# Patient Record
Sex: MALE
[De-identification: names, ages, dates, MRNs, and addresses within clinical notes are randomized per-mention and may not be internally consistent; named-entity substitution may affect disease eponyms.]

---

## 2020-06-21 ENCOUNTER — HOSPITAL ENCOUNTER (INPATIENT)
Dept: HOSPITAL 92 - ERS | Age: 75
LOS: 11 days | Discharge: HOME HEALTH | DRG: 871 | End: 2020-07-02
Attending: INTERNAL MEDICINE | Admitting: INTERNAL MEDICINE
Payer: MEDICARE

## 2020-06-21 VITALS — BODY MASS INDEX: 18.3 KG/M2

## 2020-06-21 DIAGNOSIS — Z79.82: ICD-10-CM

## 2020-06-21 DIAGNOSIS — I21.A1: ICD-10-CM

## 2020-06-21 DIAGNOSIS — J12.89: ICD-10-CM

## 2020-06-21 DIAGNOSIS — Z79.899: ICD-10-CM

## 2020-06-21 DIAGNOSIS — E11.65: ICD-10-CM

## 2020-06-21 DIAGNOSIS — E87.1: ICD-10-CM

## 2020-06-21 DIAGNOSIS — Z79.02: ICD-10-CM

## 2020-06-21 DIAGNOSIS — E11.22: ICD-10-CM

## 2020-06-21 DIAGNOSIS — Z99.2: ICD-10-CM

## 2020-06-21 DIAGNOSIS — N18.6: ICD-10-CM

## 2020-06-21 DIAGNOSIS — U07.1: ICD-10-CM

## 2020-06-21 DIAGNOSIS — G93.41: ICD-10-CM

## 2020-06-21 DIAGNOSIS — I10: ICD-10-CM

## 2020-06-21 DIAGNOSIS — I25.2: ICD-10-CM

## 2020-06-21 DIAGNOSIS — A41.89: Primary | ICD-10-CM

## 2020-06-21 DIAGNOSIS — E87.6: ICD-10-CM

## 2020-06-21 DIAGNOSIS — D63.1: ICD-10-CM

## 2020-06-21 DIAGNOSIS — Z79.84: ICD-10-CM

## 2020-06-21 DIAGNOSIS — I25.10: ICD-10-CM

## 2020-06-21 LAB
ALBUMIN SERPL BCG-MCNC: 3.3 G/DL (ref 3.4–4.8)
ALP SERPL-CCNC: 104 U/L (ref 40–110)
ALT SERPL W P-5'-P-CCNC: (no result) U/L (ref 8–55)
ANION GAP SERPL CALC-SCNC: 14 MMOL/L (ref 10–20)
AST SERPL-CCNC: 13 U/L (ref 5–34)
BASOPHILS # BLD AUTO: 0 THOU/UL (ref 0–0.2)
BASOPHILS NFR BLD AUTO: 0 % (ref 0–1)
BILIRUB SERPL-MCNC: 1.1 MG/DL (ref 0.2–1.2)
BUN SERPL-MCNC: 14 MG/DL (ref 8.4–25.7)
CALCIUM SERPL-MCNC: 8.6 MG/DL (ref 7.8–10.44)
CHLORIDE SERPL-SCNC: 93 MMOL/L (ref 98–107)
CK MB SERPL-MCNC: 0.8 NG/ML (ref 0–6.6)
CK SERPL-CCNC: 37 U/L (ref 30–200)
CO2 SERPL-SCNC: 33 MMOL/L (ref 23–31)
CREAT CL PREDICTED SERPL C-G-VRATE: 0 ML/MIN (ref 70–130)
EOSINOPHIL # BLD AUTO: 0 THOU/UL (ref 0–0.7)
EOSINOPHIL NFR BLD AUTO: 0.3 % (ref 0–10)
GLOBULIN SER CALC-MCNC: 3.6 G/DL (ref 2.4–3.5)
GLUCOSE SERPL-MCNC: 103 MG/DL (ref 83–110)
HGB BLD-MCNC: 9.6 G/DL (ref 14–18)
HGB BLD-MCNC: 9.8 G/DL (ref 14–18)
LIPASE SERPL-CCNC: 15 U/L (ref 8–78)
LYMPHOCYTES # BLD: 0.4 THOU/UL (ref 1.2–3.4)
LYMPHOCYTES NFR BLD AUTO: 16.2 % (ref 21–51)
MCH RBC QN AUTO: 36.7 PG (ref 27–31)
MCV RBC AUTO: 103 FL (ref 78–98)
MDIFF COMPLETE?: YES
MONOCYTES # BLD AUTO: 0.2 THOU/UL (ref 0.11–0.59)
MONOCYTES NFR BLD AUTO: 9 % (ref 0–10)
NEUTROPHILS # BLD AUTO: 2 THOU/UL (ref 1.4–6.5)
NEUTROPHILS NFR BLD AUTO: 74.6 % (ref 42–75)
PLATELET # BLD AUTO: 78 THOU/UL (ref 130–400)
PLATELET # BLD AUTO: 99 THOU/UL (ref 130–400)
POTASSIUM SERPL-SCNC: 3.3 MMOL/L (ref 3.5–5.1)
RBC # BLD AUTO: 2.66 MILL/UL (ref 4.7–6.1)
SODIUM SERPL-SCNC: 137 MMOL/L (ref 136–145)
TROPONIN I SERPL DL<=0.01 NG/ML-MCNC: 0.43 NG/ML (ref ?–0.03)
TROPONIN I SERPL DL<=0.01 NG/ML-MCNC: 0.47 NG/ML (ref ?–0.03)
WBC # BLD AUTO: 2.7 THOU/UL (ref 4.8–10.8)

## 2020-06-21 PROCEDURE — 82728 ASSAY OF FERRITIN: CPT

## 2020-06-21 PROCEDURE — 85025 COMPLETE CBC W/AUTO DIFF WBC: CPT

## 2020-06-21 PROCEDURE — 82550 ASSAY OF CK (CPK): CPT

## 2020-06-21 PROCEDURE — 8E0ZXY6 ISOLATION: ICD-10-PCS | Performed by: INTERNAL MEDICINE

## 2020-06-21 PROCEDURE — 87635 SARS-COV-2 COVID-19 AMP PRB: CPT

## 2020-06-21 PROCEDURE — 85379 FIBRIN DEGRADATION QUANT: CPT

## 2020-06-21 PROCEDURE — 85730 THROMBOPLASTIN TIME PARTIAL: CPT

## 2020-06-21 PROCEDURE — 85014 HEMATOCRIT: CPT

## 2020-06-21 PROCEDURE — 84484 ASSAY OF TROPONIN QUANT: CPT

## 2020-06-21 PROCEDURE — 87340 HEPATITIS B SURFACE AG IA: CPT

## 2020-06-21 PROCEDURE — 80048 BASIC METABOLIC PNL TOTAL CA: CPT

## 2020-06-21 PROCEDURE — 93005 ELECTROCARDIOGRAM TRACING: CPT

## 2020-06-21 PROCEDURE — 85049 AUTOMATED PLATELET COUNT: CPT

## 2020-06-21 PROCEDURE — 82553 CREATINE MB FRACTION: CPT

## 2020-06-21 PROCEDURE — 82140 ASSAY OF AMMONIA: CPT

## 2020-06-21 PROCEDURE — 36416 COLLJ CAPILLARY BLOOD SPEC: CPT

## 2020-06-21 PROCEDURE — 94760 N-INVAS EAR/PLS OXIMETRY 1: CPT

## 2020-06-21 PROCEDURE — 96365 THER/PROPH/DIAG IV INF INIT: CPT

## 2020-06-21 PROCEDURE — 86140 C-REACTIVE PROTEIN: CPT

## 2020-06-21 PROCEDURE — 71045 X-RAY EXAM CHEST 1 VIEW: CPT

## 2020-06-21 PROCEDURE — 90935 HEMODIALYSIS ONE EVALUATION: CPT

## 2020-06-21 PROCEDURE — 80061 LIPID PANEL: CPT

## 2020-06-21 PROCEDURE — G0257 UNSCHED DIALYSIS ESRD PT HOS: HCPCS

## 2020-06-21 PROCEDURE — 80053 COMPREHEN METABOLIC PANEL: CPT

## 2020-06-21 PROCEDURE — 85018 HEMOGLOBIN: CPT

## 2020-06-21 PROCEDURE — 36415 COLL VENOUS BLD VENIPUNCTURE: CPT

## 2020-06-21 PROCEDURE — 83690 ASSAY OF LIPASE: CPT

## 2020-06-21 PROCEDURE — U0003 INFECTIOUS AGENT DETECTION BY NUCLEIC ACID (DNA OR RNA); SEVERE ACUTE RESPIRATORY SYNDROME CORONAVIRUS 2 (SARS-COV-2) (CORONAVIRUS DISEASE [COVID-19]), AMPLIFIED PROBE TECHNIQUE, MAKING USE OF HIGH THROUGHPUT TECHNOLOGIES AS DESCRIBED BY CMS-2020-01-R: HCPCS

## 2020-06-21 NOTE — HP
PRIMARY CARE PHYSICIAN:  Unknown.



CHIEF COMPLAINT:  Altered mental status and weakness.



HISTORY OF PRESENT ILLNESS:  The history of present illness is taken from discussion

with the ER physician and the emergency room records as there is no family at the

bedside and the patient is too confused to give me any history and history was also

attempted to be obtained through a professional , but this was not

possible due to the patient's confusion as well.  Mr. Reyes is a 75-year-old

gentleman, who has a history of end-stage renal disease, on hemodialysis.  He was

brought in by family members as he has been tested for coronavirus, but apparently

they did not have the results back yet.  They were noticed that he seemed confused

and he was noted to be febrile on admission.  Apparently, they noticed that he was

shaking.  Otherwise, no other history is obtained.  The patient when asked if he was

having any shortness of breath, he denied this, or any chest pain, he also denied,

but he was not able to tell me where he is.  He was not able to tell the date; when

asked, he just stared at me, but he was able to answer those other questions. 



REVIEW OF SYSTEMS:  Currently unobtainable.



PAST MEDICAL HISTORY:  Significant for what appears to be coronary artery disease

and end-stage renal disease. 



PAST SURGICAL HISTORY:  Unknown.



SOCIAL HISTORY:  Unknown.



ALLERGIES:  NO DRUG ALLERGIES THAT WE ARE AWARE OF.



FAMILY HISTORY:  Unobtainable due to the patient being confused.



MEDICATIONS:  As reported from the EMS, that picked him up, that he was on,

1. Plavix daily.

2. Metoprolol.

3. Glipizide.

4. Simvastatin.



PHYSICAL EXAMINATION:

GENERAL:  He is awake, but he does tend to fall asleep easily during the exam.

Unable to assess orientation.  He is well developed and well nourished in

appearance. 

VITAL SIGNS:  Blood pressure 136/51, heart rate 72, respiratory rate 16, temperature

100.6, and O2 sat 96% on room air. 

HEENT:  Pupils are equal, round, and reactive to light.  Extraocular muscles are

intact.  Sclerae anicteric. 

NECK:  No adenopathy.  No bruits. 

LUNGS:  He has bilateral rales, very soft, faint at the bases.  There is no wheezing

or rhonchi. 

CARDIOVASCULAR:  He has a normal S1 and S2.  There is no S3 or S4.  No murmurs,

clicks or rubs. 

ABDOMEN:  Soft, nontender, and nondistended.  Positive for bowel sounds.  No

rebound.  No guarding.  No organomegaly. 

EXTREMITIES:  There is no clubbing or cyanosis.  No edema.  No calf tenderness.  No

joint effusions. 

NEUROLOGIC:  He is moving all extremities and is grossly nonfocal. 

SKIN AND INTEGUMENT:  There is no significant skin changes, no rash.



LABORATORY DATA:  White blood cell count 2.7, hemoglobin 9.8, hematocrit 27.4, and

platelet count 99.  Chemistry; sodium 137, potassium 3.3, chloride 93, CO2 of 33,

BUN 14, creatinine 3.91, and glucose 103.  Troponin is 0.372. 



DIAGNOSTIC DATA:  He had a chest x-ray, in which he had borderline cardiomegaly,

increased pulmonary vascular markings bilaterally, and was not able to see the

costophrenic angles clearly, that is by my reading.  There is an EKG done by the EMS

showing some T-wave inversions in I and aVL and some nonspecific ST-T wave changes

in lead II and III.  This is by my reading. 



ASSESSMENT:  

1. This is a 75-year-old gentleman, who presents with what appears to be a fairly

sudden onset of altered mental status and fever according to the family.  He has

exposure to the COVID-19 virus by family members and is at high risk of having the

infection himself.  It appears as if he has bilateral infiltrates, fever and an

elevated troponin, which is likely demand due to pneumonia, and sepsis.  He will be

admitted, started on empiric antibiotics for community-acquired pneumonia.  Placed

on respiratory isolation as well as droplet precautions given the risk for COVID

pneumonia.  We will continue to trend his cardiac enzymes as well as his

inflammatory markers.  If his troponins go much higher than the current level, then

we will consider Cardiology evaluation.  The COVID screen has been obtained. 

2. End-stage renal disease.  We will consult Nephrology for maintenance dialysis.

3. We will need to reconcile and restart his home medications, and then also

hopefully we will be able to discuss with family to see if there is any additional

history that can be obtained that might affect the course of his treatment. 







Job ID:  [id number]

## 2020-06-21 NOTE — CON
REASON FOR CONSULTATION:  ESRD on hemodialysis



HISTORY OF PRESENT ILLNESS: Patient is a 75-year-old male with past medical 
history of ESRD, on hemodialysis; hypertension, who was brought to the hospital 
with complaints of fever and confusion. Patient is confused, and no family is 
present at the beds. Attempted to communicate with the patient with the help of 
we do . Patient is unable to engage in conversation does not answer 
questions appropriately. Information was obtained from the chart. Patient was 
brought to ED by the family members as the patient was found to be confused. 
The patient was apparently tested for coronavirus, results pending.  There was 
no mention of shortness of breath, fever, or chills. Patient's hemodialysis 
schedule as outpatient is Tuesday, Thursday and Saturday.  Cannot confirm if 
the patient had dialysis yesterday.



PAST MEDICAL HISTORY:  ESRD, on hemodialysis; hypertension.



PSH, Social history and family history: unable to obtain



PHYSICAL EXAMINATION:

VITAL SIGNS:  Blood pressure 136/51, pulse rate 72 per minute, respiratory rate 
16

per minute, temperature 100.6 Fahrenheit, oxygen saturation 96% on room air. 

GENERAL:  The patient is not in pain or discomfort. 

HEENT:  Mucous membranes are moist.  No icterus. 

CVS:  Rhythm regular, rate normal.  No murmurs. 

LUNGS:  Air entry equal bilaterally, scattered crackles right>left.  No 
wheezing. 

ABDOMEN:  Soft.  No tenderness.  Bowel sounds present. 

EXTREMITIES:  No edema or ulcers. 

CNS: Patient is awake, following commands. 

PSYCHIATRIC:  Not agitated.



LABORATORY DATA:  Hemoglobin 9.6, hematocrit 28.4.  Sodium 137, potassium 3.3,

bicarb 33, BUN 14, creatinine 3.91. 



ASSESSMENT AND PLAN:  

1. End-stage renal disease, on hemodialysis - outpatient schedule is Tuesday, 
Thursday, and Saturday.  We will evaluate in the a.m. for need for renal 
replacement therapy. 

2. Pneumonia - the patient is on Rocephin and Zithromax. 

3. Dose medications for end-stage renal disease. 



Discussed with RN



Thank you for allowing to participate in the management of this patient.







Job ID:  [id number]



MTDD

## 2020-06-21 NOTE — RAD
PORTABLE CHEST ONE VIEW:

 

06/21/2020

 

10:33 a.m.

 

HISTORY:

Fever. Dyspnea.

 

COMPARISON: 

12/24/2009

 

FINDINGS:

The heart size is borderline. There are changes of median sternotomy. There are right-sided vascular 
stents. There are bibasilar infiltrates. No pneumothoraces or large effusions are seen.

 

POS: [facility]

## 2020-06-22 LAB
ANION GAP SERPL CALC-SCNC: 12 MMOL/L (ref 10–20)
APTT PPP: (no result) SEC (ref 22.9–36.1)
APTT PPP: 116.1 SEC (ref 22.9–36.1)
BASOPHILS # BLD AUTO: 0 THOU/UL (ref 0–0.2)
BASOPHILS NFR BLD AUTO: 1.3 % (ref 0–1)
BUN SERPL-MCNC: 21 MG/DL (ref 8.4–25.7)
CALCIUM SERPL-MCNC: 8.2 MG/DL (ref 7.8–10.44)
CHD RISK SERPL-RTO: 2.7 (ref ?–4.5)
CHLORIDE SERPL-SCNC: 95 MMOL/L (ref 98–107)
CHOLEST SERPL-MCNC: 86 MG/DL
CO2 SERPL-SCNC: 31 MMOL/L (ref 23–31)
CREAT CL PREDICTED SERPL C-G-VRATE: 11 ML/MIN (ref 70–130)
CRP SERPL-MCNC: 11.94 MG/DL
EOSINOPHIL # BLD AUTO: 0 THOU/UL (ref 0–0.7)
EOSINOPHIL NFR BLD AUTO: 1.3 % (ref 0–10)
GLUCOSE SERPL-MCNC: 89 MG/DL (ref 83–110)
HDLC SERPL-MCNC: 32 MG/DL
HGB BLD-MCNC: 9 G/DL (ref 14–18)
LDLC SERPL CALC-MCNC: 40 MG/DL
LYMPHOCYTES # BLD: 0.4 THOU/UL (ref 1.2–3.4)
LYMPHOCYTES NFR BLD AUTO: 23.7 % (ref 21–51)
MACROCYTES BLD QL SMEAR: (no result) (100X)
MCH RBC QN AUTO: 35.8 PG (ref 27–31)
MCV RBC AUTO: 105 FL (ref 78–98)
MDIFF COMPLETE?: YES
MONOCYTES # BLD AUTO: 0.2 THOU/UL (ref 0.11–0.59)
MONOCYTES NFR BLD AUTO: 8.3 % (ref 0–10)
NEUTROPHILS # BLD AUTO: 1.2 THOU/UL (ref 1.4–6.5)
NEUTROPHILS NFR BLD AUTO: 65.4 % (ref 42–75)
PLATELET # BLD AUTO: 99 THOU/UL (ref 130–400)
POTASSIUM SERPL-SCNC: 3.1 MMOL/L (ref 3.5–5.1)
RBC # BLD AUTO: 2.51 MILL/UL (ref 4.7–6.1)
SODIUM SERPL-SCNC: 135 MMOL/L (ref 136–145)
TRIGL SERPL-MCNC: 70 MG/DL (ref ?–150)
WBC # BLD AUTO: 1.8 THOU/UL (ref 4.8–10.8)

## 2020-06-22 RX ADMIN — ASPIRIN SCH MG: 81 TABLET ORAL at 08:10

## 2020-06-22 RX ADMIN — Medication SCH ML: at 20:14

## 2020-06-22 RX ADMIN — NITROGLYCERIN SCH: 20 OINTMENT TOPICAL at 15:09

## 2020-06-22 RX ADMIN — Medication SCH ML: at 08:11

## 2020-06-22 RX ADMIN — NITROGLYCERIN SCH: 20 OINTMENT TOPICAL at 06:14

## 2020-06-22 RX ADMIN — NITROGLYCERIN SCH INCH: 20 OINTMENT TOPICAL at 21:11

## 2020-06-22 RX ADMIN — NITROGLYCERIN SCH: 20 OINTMENT TOPICAL at 01:41

## 2020-06-22 NOTE — PRG
DATE OF SERVICE:  06/22/2020



SUBJECTIVE:  Patient was seen and examined at bedside and overnight events noted.

Patient denies any shortness of breath or chest pain or palpitation.  No history of

nausea or vomiting or diarrhea or fever or chills or cramps. 



OBJECTIVE:  GENERAL:  This is a well-built male, in no apparent distress. 

VITAL SIGNS:  Temperature 98.7.  Heart Rate 83.  Respiratory rate 18.  Blood

pressure 143/60. 



HEENT:  Atraumatic, normocephalic.  Oral mucosa is moist. 

NECK:  Supple. 

CARDIOVASCULAR:  S1, S2 heard.  Rate and rhythm regular. 

RESPIRATORY:  Clear to auscultation. 

GASTROINTESTINAL:  Abdomen is soft. 

MUSCULOSKELETAL:  No tenderness.  No edema. 

DERMATOLOGIC:  No skin rash. 

NEUROLOGIC:  Alert and awake and oriented x3.  No focal neurologic deficits. Moving

all the extremities. 

PSYCHIATRIC:  Mood and affect normal.



LABORATORY DATA:  Potassium is 3.1, BUN is 21, creatinine is 4.9.



ASSESSMENT AND PLAN:  

1. End-stage renal disease, on hemodialysis.  Continue dialysis on TTS.

2. History of hypertension.

3. Chronic anemia.

4. Edema, controlled.

5. Hypokalemia.

6. Hyponatremia.

7. Altered mentation.



PLAN:  Plan is to continue on dialysis as tolerated on Tuesday, Thursday, and

Saturday. 







Job ID:  [id number]

## 2020-06-22 NOTE — PDOC.HOSPP
- Subjective


Encounter Date: 06/22/20


Encounter Time: 15:11


Subjective: 





Mr. Reyes was seen today in follow-up of COVID infection and confusion. He is 

more alert than yesterday, but still does not answer questions consistently. He 

denies feeling short of breath and denies chest pain.





- Objective


Vital Signs & Weight: 


 Vital Signs (12 hours)











  Temp Pulse Resp BP Pulse Ox


 


 06/22/20 12:19  98.7 F  83  18  143/64 H  93 L


 


 06/22/20 09:45      97


 


 06/22/20 08:20  98.5 F  104 H  18  146/73 H  96


 


 06/22/20 03:11  99.7 F H  82  18   98








 Weight











Admit Weight                   135 lb 14.287 oz


 


Weight                         135 lb 14.287 oz














Result Diagrams: 


 06/22/20 04:44





 06/22/20 04:44


Additional Labs: 


 Accuchecks











  06/22/20 06/21/20





  12:19 14:38


 


POC Glucose  62 L  68 L














Hospitalist ROS





- Medication


Medications: 


Active Medications











Generic Name Dose Route Start Last Admin





  Trade Name Freq  PRN Reason Stop Dose Admin


 


Acetaminophen  650 mg  06/21/20 15:16  06/22/20 08:10





  Tylenol  PO   650 mg





  Q4H PRN   Administration





  Headache/Fever/Mild Pain (1-3)   





     





     





     


 


Aspirin  81 mg  06/22/20 09:00  06/22/20 08:10





  Ecotrin  PO   81 mg





  DAILY MANDEEP   Administration





     





     





     





     


 


Carvedilol  6.25 mg  06/21/20 17:00  06/22/20 08:10





  Coreg  PO   6.25 mg





  BID-WM MANDEEP   Administration





     





     





     





     


 


Clopidogrel Bisulfate  75 mg  06/22/20 09:00  06/22/20 08:10





  Plavix  PO   75 mg





  DAILY MANDEEP   Administration





     





     





     





     


 


Heparin Sodium (Porcine)  0 units  06/21/20 17:30  06/22/20 00:48





  Heparin 1,000 Units/Ml (10 Ml)  SLOW IVP   3,696 unit





  ASDIR MANDEEP   Administration





     





     





  Protocol   





     


 


Metoprolol Tartrate  25 mg  06/22/20 09:00  06/22/20 08:10





  Lopressor  PO   25 mg





  BID MANDEEP   Administration





     





     





     





     


 


Nitroglycerin  0.5 inch  06/21/20 22:00  06/22/20 15:09





  Nitro-Bid 2% Ointment  TOP   Not Given





  Q8HR [facility]   





     





     





     





     


 


Pantoprazole Sodium  40 mg  06/22/20 09:00  06/22/20 08:10





  Protonix  PO   40 mg





  DAILY MANDEEP   Administration





     





     





     





     


 


Sevelamer Carbonate  1,600 mg  06/22/20 08:00  06/22/20 12:04





  Renvela  PO   Not Given





  TID-WM MANDEEP   





     





     





     





     


 


Sodium Chloride  10 ml  06/22/20 09:00  06/22/20 08:11





  Flush - Normal Saline  IVF   10 ml





  Q12HR MANDEEP   Administration





     





     





     





     














- Exam


Eye: PERRL, anicteric sclera


Heart: RRR, no murmur, no gallops, no rubs, normal peripheral pulses


Respiratory: rales (+ rales bilaterally)


Gastrointestinal: soft, non-tender, non-distended, normal bowel sounds, no 

palpable masses


Extremities: no cyanosis, no edema





Hosp A/P


(1) COVID-19 virus infection


Code(s): U07.1 - COVID-19   Status: Acute   





(2) Metabolic encephalopathy


Code(s): G93.41 - METABOLIC ENCEPHALOPATHY   Status: Acute   





(3) End stage renal disease on dialysis


Code(s): N18.6 - END STAGE RENAL DISEASE; Z99.2 - DEPENDENCE ON RENAL DIALYSIS 

  Status: Chronic   





(4) Hypertension


Code(s): I10 - ESSENTIAL (PRIMARY) HYPERTENSION   Status: Chronic   





(5) CAD (coronary artery disease)


Code(s): I25.10 - ATHSCL HEART DISEASE OF NATIVE CORONARY ARTERY W/O ANG PCTRS 

  Status: Chronic   





- Plan





* Metabolic encephalopathy- likely due to COVID infection


* Will continue supportive care


* Will get ID opinion as to whether he is a candidate for Remdesivir


* Continue to trend Inflammatory markers


* ESRD- continue Dialysis as per Nephrology


* Hyperkalemia- manage as per Nephrology


* HTN- blood pressure is stable


* Elevated troponins- this is likely due to NSTEMI type 2- demand ischemia from 

the COVID infection

## 2020-06-23 LAB
ANION GAP SERPL CALC-SCNC: 15 MMOL/L (ref 10–20)
APTT PPP: 241.1 SEC (ref 22.9–36.1)
BASOPHILS # BLD AUTO: 0 THOU/UL (ref 0–0.2)
BASOPHILS NFR BLD AUTO: 1.1 % (ref 0–1)
BUN SERPL-MCNC: 33 MG/DL (ref 8.4–25.7)
CALCIUM SERPL-MCNC: 8.2 MG/DL (ref 7.8–10.44)
CHLORIDE SERPL-SCNC: 96 MMOL/L (ref 98–107)
CO2 SERPL-SCNC: 28 MMOL/L (ref 23–31)
CREAT CL PREDICTED SERPL C-G-VRATE: 8 ML/MIN (ref 70–130)
EOSINOPHIL # BLD AUTO: 0.1 THOU/UL (ref 0–0.7)
EOSINOPHIL NFR BLD AUTO: 7 % (ref 0–10)
GLUCOSE SERPL-MCNC: 45 MG/DL (ref 83–110)
HBSAG INDEX: 0.27 S/CO (ref 0–0.99)
HGB BLD-MCNC: 7.9 G/DL (ref 14–18)
HGB BLD-MCNC: 9 G/DL (ref 14–18)
LYMPHOCYTES # BLD: 0.6 THOU/UL (ref 1.2–3.4)
LYMPHOCYTES NFR BLD AUTO: 31 % (ref 21–51)
MCH RBC QN AUTO: 35.2 PG (ref 27–31)
MCV RBC AUTO: 106 FL (ref 78–98)
MONOCYTES # BLD AUTO: 0.2 THOU/UL (ref 0.11–0.59)
MONOCYTES NFR BLD AUTO: 9 % (ref 0–10)
NEUTROPHILS # BLD AUTO: 1 THOU/UL (ref 1.4–6.5)
NEUTROPHILS NFR BLD AUTO: 51.9 % (ref 42–75)
PLATELET # BLD AUTO: 88 THOU/UL (ref 130–400)
PLATELET # BLD AUTO: 92 THOU/UL (ref 130–400)
POTASSIUM SERPL-SCNC: 3.7 MMOL/L (ref 3.5–5.1)
RBC # BLD AUTO: 2.25 MILL/UL (ref 4.7–6.1)
SODIUM SERPL-SCNC: 135 MMOL/L (ref 136–145)
WBC # BLD AUTO: 1.9 THOU/UL (ref 4.8–10.8)

## 2020-06-23 RX ADMIN — Medication SCH ML: at 21:15

## 2020-06-23 RX ADMIN — ASPIRIN SCH MG: 81 TABLET ORAL at 09:30

## 2020-06-23 RX ADMIN — NITROGLYCERIN SCH: 20 OINTMENT TOPICAL at 17:13

## 2020-06-23 RX ADMIN — Medication SCH ML: at 11:16

## 2020-06-23 RX ADMIN — NITROGLYCERIN SCH INCH: 20 OINTMENT TOPICAL at 06:02

## 2020-06-23 NOTE — CON
DATE OF CONSULTATION:  06/22/2020



REASON FOR CONSULTATION:  COVID pneumonia.



HISTORY OF PRESENT ILLNESS:  A 75-year-old, history of type 2 diabetes, end-
stage

renal disease, on hemodialysis through an AV fistula in right upper extremity, 
with

history of cough and dyspnea.  Duration of illness is approximately 4 days 
before

admission.  Family member had acquired COVID and probably transmitted to him.  
He

came in yesterday, and on arrival, /60, pulse 87, respirations 24, 
temperature

100.6, and O2 saturation 96% on room air. He appeared disoriented, uncomfortable
,

diffuse expiratory wheezing, S1 and S2, abdomen was soft. Initial chest x-ray 
with

diffuse bilateral infiltrates at the bases.  Currently, Mr. Reyes keeps his eyes

closed, but he answers questions after some insistence.  I was able to have him

drink some sugary fluid because of his decreased blood sugar of 58, and he drank

pretty much almost the entire glass.  He denies any pain at the moment.  He 
does not

have dyspnea.  He is coughing intermittently, but not much, and he is voiding 
in a

diaper. 



PAST MEDICAL HISTORY:  Type 2 diabetes, end-stage renal disease.



SURGICAL HISTORY:  AV fistula placement, prior hemodialysis catheters, and 
coronary

artery bypass graft surgery. 



SOCIAL HISTORY:  Lives in the area with family.



ALLERGIES:  NONE.



FAMILY HISTORY:  Type 2 diabetes.



CURRENT MEDICATIONS:  

1. Tylenol.

2. Ecotrin.

3. Lipitor.

4. Azithromycin.

5. Ceftriaxone.

6. Plavix.

7. Metoprolol.

8. Nitroglycerin.

9. Pantoprazole.

10. Renvela.



PHYSICAL EXAMINATION:

VITAL SIGNS:  Temperature 101, /60, pulse 83, respirations 18, and O2

saturation 93% to 97%. 

SKIN: Shows the AV fistula which is patent in the right upper extremity.  Areas 
of

bruising.  No lymphadenopathy. 

HEENT:  Ocular movements conjugate.  Sclerae are white.  Pupils are 2 mm and

reactive.  Oral cavity with no native teeth remaining. 

LUNGS:  Symmetric air entry. 

HEART:  S1 and S2.  Regular rate, without murmurs. 

ABDOMEN:  Soft, not distended or tender.  No ascites.  No bladder distention. 

MUSCULOSKELETAL:  No joint inflammatory activity.  No back tenderness.  Moving

extremities equally.  No edema. 

NEUROLOGIC:  He is awake but somnolent.  He knows his name.  He tells me he is 
in

the hospital, but cannot tell me which one. Cannot give me the date. 



LABORATORY STUDIES:  Sodium 135, potassium 3.1, creatinine 4.98.  CRP 11.94.

Ferritin 1500.  D-dimer was 0.63. 



ASSESSMENT:  

1. End-stage renal disease secondary to type 2 diabetes, on hemodialysis, right

upper extremity. 

2. COVID pneumonia.  The patient is not eligible for remdesivir, and we will go

ahead and start him on Decadron.  He is saturating 93% on room air, so I am 
sure he

may start deteriorating.  It is very early in his course of illness, and we

will give him 6 mg of Decadron daily IV. 







Job ID:  [id number]



MTDD

## 2020-06-23 NOTE — PDOC.HOSPP
- Subjective


Encounter Date: 06/23/20


Encounter Time: 12:32


Subjective: 





Mr. Reyes was seen today in follow-up of COVID pneumonia and metabolic 

encephalopathy. He does not have any complaints. He is confused, but will 

answer some questions.





- Objective


Vital Signs & Weight: 


 Vital Signs (12 hours)











  Temp Pulse Resp BP Pulse Ox


 


 06/23/20 09:45  98.0 F  70  20  113/49 L  96


 


 06/23/20 08:15      95


 


 06/23/20 05:30  99.1 F    


 


 06/23/20 01:55   76  20  130/53 L  95








 Weight











Admit Weight                   135 lb 14.287 oz


 


Weight                         143 lb 6.4 oz














I&O: 


 











 06/22/20 06/23/20 06/24/20





 06:59 06:59 06:59


 


Intake Total  1037.6 


 


Output Total  0 


 


Balance  1037.6 











Result Diagrams: 


 06/23/20 08:17





 06/23/20 08:17


Additional Labs: 


 Accuchecks











  06/23/20 06/22/20 06/22/20





  10:18 20:39 18:45


 


POC Glucose  61 L  81  89














  06/22/20





  17:27


 


POC Glucose  58 L*














Hospitalist ROS





- Medication


Medications: 


Active Medications











Generic Name Dose Route Start Last Admin





  Trade Name Freq  PRN Reason Stop Dose Admin


 


Acetaminophen  650 mg  06/21/20 15:16  06/22/20 08:10





  Tylenol  PO   650 mg





  Q4H PRN   Administration





  Headache/Fever/Mild Pain (1-3)   





     





     





     


 


Aspirin  81 mg  06/22/20 09:00  06/23/20 09:30





  Ecotrin  PO   81 mg





  DAILY MANDEEP   Administration





     





     





     





     


 


Atorvastatin Calcium  20 mg  06/22/20 21:00  06/22/20 20:13





  Lipitor  PO   20 mg





  HS MANDEEP   Administration





     





     





     





     


 


Carvedilol  6.25 mg  06/21/20 17:00  06/23/20 11:18





  Coreg  PO   Not Given





  BID-WM MANDEEP   





     





     





     





     


 


Clopidogrel Bisulfate  75 mg  06/22/20 09:00  06/23/20 09:30





  Plavix  PO   75 mg





  DAILY MANDEEP   Administration





     





     





     





     


 


Dexamethasone  6 mg  06/23/20 09:00  06/23/20 09:31





  Decadron  SLOW IVP   6 mg





  DAILY MANDEEP   Administration





     





     





     





     


 


Ceftriaxone Sodium 1 gm/  100 mls @ 200 mls/hr  06/22/20 21:00  06/22/20 20:11





  Sodium Chloride  IVPB   100 mls





  2100 MANDEEP   Administration





     





     





     





     


 


Azithromycin 500 mg/ Sodium  250 mls @ 250 mls/hr  06/22/20 21:00  06/22/20 20:

12





  Chloride  IVPB   250 mls





  2100 MANDEEP   Administration





     





     





     





     


 


Dextrose/Water  1,000 mls @ 30 mls/hr  06/23/20 09:00  06/23/20 09:38





  D5w  IV   1,000 mls





  .Q24H MANDEEP   Administration





     





     





     





     


 


Metoprolol Tartrate  25 mg  06/22/20 09:00  06/23/20 11:18





  Lopressor  PO   Not Given





  BID MANDEEP   





     





     





     





     


 


Nitroglycerin  0.5 inch  06/21/20 22:00  06/23/20 06:02





  Nitro-Bid 2% Ointment  TOP   0.5 inch





  Q8HR MANDEEP   Administration





     





     





     





     


 


Pantoprazole Sodium  40 mg  06/22/20 09:00  06/23/20 11:19





  Protonix  PO   Not Given





  DAILY MANDEEP   





     





     





     





     


 


Sevelamer Carbonate  1,600 mg  06/22/20 08:00  06/23/20 12:22





  Renvela  PO   Not Given





  TID-WM MANDEEP   





     





     





     





     


 


Sodium Chloride  10 ml  06/22/20 09:00  06/23/20 11:16





  Flush - Normal Saline  IVF   10 ml





  Q12HR MANDEEP   Administration





     





     





     





     














- Exam


Eye: PERRL, anicteric sclera


Heart: RRR, no murmur, no gallops, no rubs, normal peripheral pulses


Respiratory: rales


Gastrointestinal: soft, non-tender, non-distended, normal bowel sounds, no 

palpable masses, no hepatomegaly


Extremities: no cyanosis, no edema





Hosp A/P


(1) COVID-19 virus infection


Code(s): U07.1 - COVID-19   Status: Acute   





(2) Metabolic encephalopathy


Code(s): G93.41 - METABOLIC ENCEPHALOPATHY   Status: Acute   





(3) End stage renal disease on dialysis


Code(s): N18.6 - END STAGE RENAL DISEASE; Z99.2 - DEPENDENCE ON RENAL DIALYSIS 

  Status: Chronic   





(4) Hypertension


Code(s): I10 - ESSENTIAL (PRIMARY) HYPERTENSION   Status: Chronic   





(5) CAD (coronary artery disease)


Code(s): I25.10 - ATHSCL HEART DISEASE OF NATIVE CORONARY ARTERY W/O ANG PCTRS 

  Status: Chronic   





- Plan





* Metabolic encephalopathy- likely due to COVID infection


* Will continue supportive care


* He has been evaluated by ID. He is not considered a Remdesivir candidate


* Continue to trend Inflammatory markers


* ESRD- continue Dialysis as per Nephrology


* Hypokalemia- improved


* HTN- blood pressure is stable


* Elevated troponins- this is likely due to NSTEMI type 2- demand ischemia from 

the COVID infection- continue Lovenox, therapeutic dose, renal dosed

## 2020-06-23 NOTE — PRG
DATE OF SERVICE:  06/23/2020



SUBJECTIVE:  The patient was seen and examined at bedside and overnight events

noted.  The patient denies any shortness of breath or chest pain or palpitation.  No

history of nausea or vomiting or diarrhea or fever or chills or cramps. 



OBJECTIVE:  GENERAL:  This is a well-built male, in no apparent distress. 

VITAL SIGNS:  Temperature 98.0.  Heart rate 70.  Respiratory rate 20.  Blood

pressure 113/49. 

HEENT:  Atraumatic, normocephalic. Oral mucosa is moist. 

NECK:  Supple. 

CARDIOVASCULAR:  S1, S2 heard.  Rate and rhythm regular. 

RESPIRATORY:  Clear to auscultation. 

GASTROINTESTINAL:  Abdomen is soft. 

MUSCULOSKELETAL:  No tenderness.  No edema. 

DERMATOLOGIC:  No skin rash. 

NEUROLOGIC:  Alert and awake and oriented x3.  No focal neurologic deficits. Moving

all the extremities. 

PSYCHIATRIC:  Mood and affect normal.



LABORATORY DATA:  Potassium is 3.7, BUN is 33, creatinine is 6.7.



ASSESSMENT AND PLAN:  

1. End-stage renal disease.  Continue on hemodialysis.

2. Hypertension.

3. Anemia of chronic disease.

4. Hypokalemia.

5. Hyponatremia.

6. Altered mentation. 



Continue on dialysis as tolerated.





Job ID:  [id number]

## 2020-06-24 RX ADMIN — INSULIN GLARGINE SCH MLS: 100 INJECTION, SOLUTION SUBCUTANEOUS at 20:15

## 2020-06-24 RX ADMIN — Medication SCH: at 08:15

## 2020-06-24 RX ADMIN — ASPIRIN SCH MG: 81 TABLET ORAL at 08:12

## 2020-06-24 RX ADMIN — Medication SCH ML: at 19:51

## 2020-06-24 NOTE — PQF
CLINICAL DOCUMENTATION IMPROVEMENT CLARIFICATION FORM:  ICD-10 Updated

PLEASE DO AN ADDENDUM TO THE PROGRESS NOTE WITH ANY DOCUMENTATION UPDATES OR 
ADDITIONS AND CARRY THROUGH TO DC SUMMARY.   THANK YOU.



DATE:   6/24/2020; 6/25/2020                                ATTN: Dr. Ruiz



Please exercise your independent, professional judgment in responding to the 
clarification form. 

Clinical indicators are provided on the bottom of this form for your review





Please check appropriate box(s) to clarify if the following diagnosis has been 
ruled in or  ruled out:

___________________Sepsis     



[ X ] Ruled in diagnosis

     [  X] Continue to treat        [  ] Resolved

[  ] Ruled out diagnosis

[  ] Improving

[  ] Cannot rule out diagnosis

[  ] Other diagnosis ___________

[  ] Unable to determine



In addition, please specify:

Present on Admission (POA):  [X  ] Yes             [  ] No             [  ] 
Unable to determine



For continuity of documentation, please document condition throughout progress 
notes and discharge summary.  Thank You.



CLINICAL INDICATORS - SIGNS / SYMPTOMS / LABS  / RESULTS AND LOCATION IN MR



ER Record 6/21:  VS: /58,  pulse 80, Resp. 27, Temp 101.9, O2 sat 94 RA



H&P 6/21:  Lab:  WBC 2.7

    Assessment:  It appears as if he has bilateral infiltrates, fever and an 
elevated troponin, 

                         which is  likely demand due to pneumonia, and sepsis. 



RISKS:

H&P 6/21: 74 yo with hx of ESRD on hemodialysis. 

6/22 (Joseph)  COVID-19 virus infection. Metabolic encephalopathy.



TREATMENT:

ID CONSULT 6/22

Order 6/22-6/23: Zithromax 500mg IV; Rocephin 1 gm IV

MAR: Order 6/22: Decadron 6mg slow IV daily

_________________________________________________________



Thank you,

Fern

(This form is maintained as a part of the permanent medical record)

2015 [employer].  All Rights Reserved

Fern Barraza RN, BSN    [email or web address]    Cell Phone: [phone]

                                                              

 

[facility]

## 2020-06-24 NOTE — PRG
DATE OF SERVICE:  06/24/2020



SUBJECTIVE:  Patient was seen and examined at bedside and overnight events noted.

Patient denies any shortness of breath or chest pain or palpitation.  No history of

nausea or vomiting or diarrhea or fever or chills or cramps. 



OBJECTIVE:  General:  This is a well-built male, in no acute distress. 

Vital Signs:  Temperature 98.2.  Heart Rate 72.  Respiratory rate 20.  Blood

pressure 174/76. 

HEENT:  Atraumatic, normocephalic. Oral mucosa is moist. 

Neck:  Supple. 

Cardiovascular:  S1, S2 heard.  Rate and rhythm regular. 

Respiratory:  Clear to auscultation. 

Gastrointestinal:  Abdomen is soft. 

Musculoskeletal:  No tenderness.  No edema. 

Dermatologic:  No skin rash. 

Neurologic:  Alert and awake and oriented x3.  No focal neurologic deficits. Moving

all the extremities. 

Psychiatric:  Mood and affect normal.



LABORATORY DATA:  No labs done today.



ASSESSMENT AND PLAN:  

1. End-stage renal disease.  Continue dialysis Tuesday, Thursday, and Saturday as

tolerated. 

2. Hypertension.

3. Anemia.

4. Hyponatremia.

5. Altered mentation. 



Continue dialysis on Tuesday, Thursday, and Saturday as tolerated.





Job ID:  [id number]

## 2020-06-24 NOTE — PDOC.HOSPP
- Subjective


Encounter Date: 06/24/20


Encounter Time: 16:46


Subjective: 





Mr. Reyes was seen today in follow-up of COVID infection and metabolic 

encephalopathy. He is still confused. 





- Objective


Vital Signs & Weight: 


 Vital Signs (12 hours)











  Temp Pulse Resp BP Pulse Ox


 


 06/24/20 15:29  98.2 F  72  22 H  174/76 H  97


 


 06/24/20 12:09  99.3 F  73  20  151/69 H  100


 


 06/24/20 08:41      100


 


 06/24/20 08:30  98.3 F  73  20  162/71 H 


 


 06/24/20 05:00  98.3 F  72  15  121/63  100








 Weight











Admit Weight                   135 lb 14.287 oz


 


Weight                         142 lb 6.698 oz














I&O: 


 











 06/23/20 06/24/20 06/25/20





 06:59 06:59 06:59


 


Intake Total 1037.6 811 360


 


Output Total 0 0 


 


Balance 1037.6 811 360











Result Diagrams: 


 06/23/20 15:02





 06/23/20 08:17


Additional Labs: 


 Accuchecks











  06/24/20 06/24/20 06/23/20





  11:13 05:01 21:24


 


POC Glucose  334 H  285 H  222 H














  06/23/20 06/23/20





  16:54 06:12


 


POC Glucose  163 H  74














Hospitalist ROS





- Medication


Medications: 


Active Medications











Generic Name Dose Route Start Last Admin





  Trade Name Freq  PRN Reason Stop Dose Admin


 


Acetaminophen  650 mg  06/21/20 15:16  06/22/20 08:10





  Tylenol  PO   650 mg





  Q4H PRN   Administration





  Headache/Fever/Mild Pain (1-3)   





     





     





     


 


Aspirin  81 mg  06/22/20 09:00  06/24/20 08:12





  Ecotrin  PO   81 mg





  DAILY MANDEEP   Administration





     





     





     





     


 


Atorvastatin Calcium  20 mg  06/22/20 21:00  06/23/20 21:15





  Lipitor  PO   20 mg





  HS MANDEEP   Administration





     





     





     





     


 


Carvedilol  6.25 mg  06/21/20 17:00  06/24/20 15:28





  Coreg  PO   6.25 mg





  BID-WM MANDEEP   Administration





     





     





     





     


 


Clopidogrel Bisulfate  75 mg  06/22/20 09:00  06/24/20 08:12





  Plavix  PO   75 mg





  DAILY MANDEEP   Administration





     





     





     





     


 


Dexamethasone  6 mg  06/23/20 09:00  06/24/20 08:12





  Decadron  SLOW IVP   6 mg





  DAILY MANDEEP   Administration





     





     





     





     


 


Enoxaparin Sodium  60 mg  06/24/20 09:00  06/24/20 08:12





  Lovenox  SC   60 mg





  DAILY MANDEEP   Administration





     





     





     





     


 


Metoprolol Tartrate  25 mg  06/22/20 09:00  06/24/20 08:16





  Lopressor  PO   Not Given





  BID MANDEEP   





     





     





     





     


 


Pantoprazole Sodium  40 mg  06/22/20 09:00  06/24/20 08:15





  Protonix  PO   40 mg





  DAILY MANDEEP   Administration





     





     





     





     


 


Sevelamer Carbonate  1,600 mg  06/22/20 08:00  06/24/20 15:28





  Renvela  PO   1,600 mg





  TID-WM MANDEEP   Administration





     





     





     





     


 


Sodium Chloride  10 ml  06/22/20 09:00  06/24/20 08:15





  Flush - Normal Saline  IVF   Not Given





  Q12HR MANDEEP   





     





     





     





     














- Exam


Eye: PERRL, anicteric sclera


Heart: RRR, no murmur, no gallops, no rubs, normal peripheral pulses


Respiratory: CTAB


Gastrointestinal: soft, non-tender, non-distended, normal bowel sounds, no 

palpable masses


Extremities: no cyanosis





Hosp A/P


(1) COVID-19 virus infection


Code(s): U07.1 - COVID-19   Status: Acute   





(2) Metabolic encephalopathy


Code(s): G93.41 - METABOLIC ENCEPHALOPATHY   Status: Acute   





(3) End stage renal disease on dialysis


Code(s): N18.6 - END STAGE RENAL DISEASE; Z99.2 - DEPENDENCE ON RENAL DIALYSIS 

  Status: Chronic   





(4) Hypertension


Code(s): I10 - ESSENTIAL (PRIMARY) HYPERTENSION   Status: Chronic   





(5) CAD (coronary artery disease)


Code(s): I25.10 - ATHSCL HEART DISEASE OF NATIVE CORONARY ARTERY W/O ANG PCTRS 

  Status: Chronic   





- Plan





* Metabolic encephalopathy-


* Will continue supportive care


* He has been evaluated by ID. He is not considered a Remdesivir candidate


* Continue to trend Inflammatory markers


* DM- blood glucose is elevated- will add a low dose Lantus


* CAD- stable- continue anticoagulation


* ESRD- continue dialysis as per Nephrology

## 2020-06-24 NOTE — PRG
DATE OF SERVICE:  06/24/2020



SUBJECTIVE:  Mr. Reyes appears somewhat despondent.  He is missing his family.  
He

wants to go home.  He denies any headaches.  He knows he is at [facility].  
He has

no respiratory symptoms.  He is not coughing.  No abdominal pain. 



OBJECTIVE:  VITAL SIGNS:  His T-max is 101 on 06/22.  He has been afebrile since
,

blood pressure 170/76, pulse 72, respirations 20 to 22, O2 saturation 

HEENT:  His left eye has some hyperemia, but that is from his chronic eye 
problems.

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 

LUNGS:  Symmetric.  Clear breath sounds. 

HEART:  S1, S2.  Regular rate. 

ABDOMEN:  Soft, not distended. 

EXTREMITIES:  I could not get him to move his lower extremities.



LABORATORY DATA:  White cell count starts at 2.7 and now is at 1.9, hemoglobin 9
,

platelets 92 with 51% neutrophils.  Creatinine 6.78.  His ferritin has gone up 
to

2600 from admission.  CRP has went up to 16, now is down to 14.  D-dimer 0.63 
and

has not been repeated. 



DIAGNOSTIC DATA:  Chest x-ray has not been repeated, though one on admission 
with

basilar infiltrates. 



ASSESSMENT AND DISCUSSION:  

1. End-stage renal disease secondary to type-2 diabetes.

2. COVID pneumonia, on Decadron with some improvement in O2 sats.  He has been 
on

room air 100%  continues on Decadron and we will have to continue following

his neurological progress. It appears that isolation and depression is a 
significant factor. 







Job ID:  [id number]



MTDD

## 2020-06-25 LAB
HGB BLD-MCNC: 8.4 G/DL (ref 14–18)
PLATELET # BLD AUTO: 118 THOU/UL (ref 130–400)

## 2020-06-25 RX ADMIN — Medication SCH ML: at 08:54

## 2020-06-25 RX ADMIN — ASPIRIN SCH MG: 81 TABLET ORAL at 08:53

## 2020-06-25 RX ADMIN — INSULIN GLARGINE SCH MLS: 100 INJECTION, SOLUTION SUBCUTANEOUS at 08:52

## 2020-06-25 RX ADMIN — INSULIN GLARGINE SCH: 100 INJECTION, SOLUTION SUBCUTANEOUS at 20:14

## 2020-06-25 RX ADMIN — INSULIN LISPRO PRN UNIT: 100 INJECTION, SOLUTION INTRAVENOUS; SUBCUTANEOUS at 04:57

## 2020-06-25 NOTE — PRG
DATE OF SERVICE:  06/25/2020



SUBJECTIVE:  Patient was seen and examined at bedside and overnight events noted.

Patient denies any shortness of breath or chest pain or palpitation.  No history of

nausea or vomiting or diarrhea or fever or chills or cramps. 



OBJECTIVE:  GENERAL:  This is well-built male, in no apparent distress. 

VITAL SIGNS:  Temperature 98.  Pulse 70.  Respiratory rate __________ .  Blood

pressure 165/73. 

HEENT:  Atraumatic, normocephalic. Oral mucosa is moist. 

NECK:  Supple. 

CARDIOVASCULAR:  S1, S2 heard.  Rate and rhythm regular. 

RESPIRATORY:  Clear to auscultation. 

GASTROINTESTINAL:  Abdomen is soft. 

MUSCULOSKELETAL:  No tenderness.  No edema. 

DERMATOLOGIC:  No skin rash. 

NEUROLOGIC:  Alert and awake and oriented x3.  No focal neurologic deficits. Moving

all the extremities. 

PSYCHIATRIC:  Mood and affect normal.



LABORATORY DATA:  No labs done today.



ASSESSMENT AND PLAN:  

1. End-stage renal disease, continue dialysis as tolerated.

2. Hypertension.

3. Anemia.

4. Hyponatremia.

5. Altered mentation. 

Labs are better.  Mentation is better.  We will continue.







Job ID:  [id number]

## 2020-06-25 NOTE — PDOC.HOSPP
- Subjective


Encounter Date: 06/25/20


Encounter Time: 14:52


Subjective: 





Mr. Reyes was seen today in follow-up of COVID pneumonia and encephalopathy. He 

does not have any new complaints. He is more interactive today. He denies 

dyspnea.





- Objective


Vital Signs & Weight: 


 Vital Signs (12 hours)











  Temp Pulse Resp BP BP BP Pulse Ox


 


 06/25/20 11:28        99


 


 06/25/20 11:26  98.5 F  61  24 H   137/63   100


 


 06/25/20 09:05        99


 


 06/25/20 08:55  98.8 F  70  26 H    165/73 H  99


 


 06/25/20 05:20   71   182/74 H   


 


 06/25/20 04:00  98.2 F  71  20    182/74 H  100








 Weight











Admit Weight                   135 lb 14.287 oz


 


Weight                         142 lb 11.2 oz














I&O: 


 











 06/24/20 06/25/20 06/26/20





 06:59 06:59 06:59


 


Intake Total 811 600 30


 


Output Total 0  


 


Balance 811 600 30











Result Diagrams: 


 06/23/20 15:02





 06/23/20 08:17


Additional Labs: 


 Accuchecks











  06/25/20 06/25/20 06/25/20





  13:45 11:32 04:57


 


POC Glucose  116 H  103  416 H














  06/25/20 06/24/20





  00:15 20:05


 


POC Glucose  542 H  528 H














Hospitalist ROS





- Medication


Medications: 


Active Medications











Generic Name Dose Route Start Last Admin





  Trade Name Freq  PRN Reason Stop Dose Admin


 


Acetaminophen  650 mg  06/21/20 15:16  06/22/20 08:10





  Tylenol  PO   650 mg





  Q4H PRN   Administration





  Headache/Fever/Mild Pain (1-3)   





     





     





     


 


Aspirin  81 mg  06/22/20 09:00  06/25/20 08:53





  Ecotrin  PO   81 mg





  DAILY MANDEEP   Administration





     





     





     





     


 


Atorvastatin Calcium  20 mg  06/22/20 21:00  06/24/20 19:50





  Lipitor  PO   20 mg





  HS MANDEEP   Administration





     





     





     





     


 


Clopidogrel Bisulfate  75 mg  06/22/20 09:00  06/25/20 08:53





  Plavix  PO   75 mg





  DAILY MANDEEP   Administration





     





     





     





     


 


Dexamethasone  6 mg  06/23/20 09:00  06/25/20 08:52





  Decadron  SLOW IVP   6 mg





  DAILY MANDEEP   Administration





     





     





     





     


 


Enoxaparin Sodium  60 mg  06/24/20 09:00  06/25/20 08:53





  Lovenox  SC   Not Given





  DAILY MANDEEP   





     





     





     





     


 


Hydralazine HCl  10 mg  06/21/20 15:16  06/25/20 05:20





  Apresoline  SLOW IVP   10 mg





  Q4H PRN   Administration





  SBP > 180 and HR < 70   





     





     





     


 


Insulin Glargine 10 units/  0.1 mls @ 0 mls/hr  06/24/20 21:00  06/24/20 20:15





  Miscellaneous Medication  SC   0.1 mls





  HS MANDEEP   Administration





     





     





     





     


 


Insulin Glargine 20 units/  0.2 mls @ 0 mls/hr  06/25/20 09:00  06/25/20 08:52





  Miscellaneous Medication  SC   0.2 mls





  QAM MANDEEP   Administration





     





     





     





     


 


Insulin Human Lispro  0 units  06/25/20 00:33  06/25/20 04:57





  Humalog  SC   10 unit





  .MODERATE SLIDING SC PRN   Administration





  Moderate Correctional Scale   





     





     





     


 


Insulin Human Lispro  0 units  06/25/20 00:33  06/25/20 00:47





  Humalog  SC   5 unit





  .BEDTIME SLIDING SC PRN   Administration





  Bedtime Correctional Scale   





     





     





     


 


Metoprolol Tartrate  25 mg  06/22/20 09:00  06/25/20 08:53





  Lopressor  PO   25 mg





  BID MANDEEP   Administration





     





     





     





     


 


Pantoprazole Sodium  40 mg  06/22/20 09:00  06/25/20 08:53





  Protonix  PO   40 mg





  DAILY MANDEEP   Administration





     





     





     





     


 


Sevelamer Carbonate  1,600 mg  06/22/20 08:00  06/25/20 11:43





  Renvela  PO   1,600 mg





  TID-WM MANDEEP   Administration





     





     





     





     


 


Sodium Chloride  10 ml  06/22/20 09:00  06/25/20 08:54





  Flush - Normal Saline  IVF   10 ml





  Q12HR MANDEEP   Administration





     





     





     





     














- Exam


Eye: PERRL


Heart: RRR, no murmur, no gallops, no rubs, normal peripheral pulses


Respiratory: rales


Gastrointestinal: soft, non-tender, non-distended, normal bowel sounds, no 

palpable masses, no hepatomegaly


Extremities: no cyanosis, no edema





Hosp A/P


(1) COVID-19 virus infection


Code(s): U07.1 - COVID-19   Status: Acute   





(2) Metabolic encephalopathy


Code(s): G93.41 - METABOLIC ENCEPHALOPATHY   Status: Acute   





(3) End stage renal disease on dialysis


Code(s): N18.6 - END STAGE RENAL DISEASE; Z99.2 - DEPENDENCE ON RENAL DIALYSIS 

  Status: Chronic   





(4) Hypertension


Code(s): I10 - ESSENTIAL (PRIMARY) HYPERTENSION   Status: Chronic   





(5) CAD (coronary artery disease)


Code(s): I25.10 - ATHSCL HEART DISEASE OF NATIVE CORONARY ARTERY W/O ANG PCTRS 

  Status: Chronic   





- Plan





* Metabolic encephalopathy-


* Will continue supportive care


* Continue Decadron IV


* Continue to trend Inflammatory markers


* DM- blood glucose is elevated-  will increase the dose of Lantus


* CAD- stable- continue anticoagulation


* ESRD- continue dialysis as per Nephrology


* HTN- blood pressure is stable

## 2020-06-26 RX ADMIN — Medication SCH ML: at 08:43

## 2020-06-26 RX ADMIN — INSULIN GLARGINE SCH MLS: 100 INJECTION, SOLUTION SUBCUTANEOUS at 08:42

## 2020-06-26 RX ADMIN — Medication SCH: at 01:57

## 2020-06-26 RX ADMIN — INSULIN LISPRO PRN UNIT: 100 INJECTION, SOLUTION INTRAVENOUS; SUBCUTANEOUS at 18:25

## 2020-06-26 RX ADMIN — ASPIRIN SCH MG: 81 TABLET ORAL at 08:42

## 2020-06-26 RX ADMIN — Medication SCH ML: at 21:35

## 2020-06-26 RX ADMIN — INSULIN GLARGINE SCH MLS: 100 INJECTION, SOLUTION SUBCUTANEOUS at 21:35

## 2020-06-26 NOTE — PRG
DATE OF SERVICE:  06/26/2020



SUBJECTIVE:  Jose Reyes does not appear as  despondent as last time.

He answers questions more easily today.  Denies dyspnea or chest pain or 
headaches.

No abdominal pain. 



OBJECTIVE:  VITAL SIGNS:  He has had a temperature of 100.6 recently, now 99.2,

blood pressure 150/70, and O2 saturations are really good anywhere from 98% to 
100%

and this is on room air. 

LUNGS:  Symmetric, clear breath sounds. 

HEART:  S1, S2, regular rate. 

ABDOMEN:  Soft, not distended or tender. 

EXTREMITIES:  Moves extremities equally.



LABORATORY DATA:  Chemistry is not particularly remarkable.  The ferritin shows 
a

decrease to 2500 and the C-reactive protein is down to 8.9.  The D-dimer has not

been repeated.  He is currently on Decadron, metoprolol, pantoprazole, Renvela. 



ASSESSMENT AND DISCUSSION:  End-stage renal disease secondary to type 2 diabetes
,

COVID pneumonia, on Decadron with clear-cut improvement in O2 saturations.  His

mental state is a little better.  We will go ahead and discontinue Decadron now
, he

has received already 4 days and on room air saturating at %







Job ID:  [id number]



MTDD

## 2020-06-26 NOTE — PDOC.HOSPP
- Subjective


Encounter Date: 06/26/20


Encounter Time: 16:52


Subjective: 





Mr. Reyes was seen today in follow-up of COVID pneumonia, and encephalopathy. 

he denies feeling short of breath. He is a bit less confused today. 





- Objective


Vital Signs & Weight: 


 Vital Signs (12 hours)











  Temp Pulse Pulse Pulse Pulse Resp BP


 


 06/26/20 14:30    69  93  72  


 


 06/26/20 13:48  99.2 F  69     18 


 


 06/26/20 08:52  100.6 F H  87     18 


 


 06/26/20 08:18       


 


 06/26/20 05:42   98     


 


 06/26/20 05:27        192/86 H


 


 06/26/20 05:25  98.9 F  95     18 














  BP BP BP BP BP Pulse Ox Pulse Ox


 


 06/26/20 14:30  141/65 H  122/58 L  157/70 H     93 L


 


 06/26/20 13:48      145/69 H  100 


 


 06/26/20 08:52     159/69 H   98 


 


 06/26/20 08:18       99 


 


 06/26/20 05:42     173/74 H   


 


 06/26/20 05:27       


 


 06/26/20 05:25     182/77 H   99 














  Pulse Ox


 


 06/26/20 14:30  96


 


 06/26/20 13:48 


 


 06/26/20 08:52 


 


 06/26/20 08:18 


 


 06/26/20 05:42 


 


 06/26/20 05:27 


 


 06/26/20 05:25 








 Weight











Admit Weight                   135 lb 14.287 oz


 


Weight                         142 lb 11.2 oz














I&O: 


 











 06/25/20 06/26/20 06/27/20





 06:59 06:59 06:59


 


Intake Total 600 30 


 


Balance 600 30 











Result Diagrams: 


 06/25/20 15:10





 06/23/20 08:17


Additional Labs: 


 Accuchecks











  06/26/20 06/26/20 06/25/20





  13:59 05:14 20:24


 


POC Glucose  133 H  84  90














Hospitalist ROS





- Medication


Medications: 


Active Medications











Generic Name Dose Route Start Last Admin





  Trade Name Freq  PRN Reason Stop Dose Admin


 


Acetaminophen  650 mg  06/21/20 15:16  06/26/20 09:27





  Tylenol  PO   650 mg





  Q4H PRN   Administration





  Headache/Fever/Mild Pain (1-3)   





     





     





     


 


Aspirin  81 mg  06/22/20 09:00  06/26/20 08:42





  Ecotrin  PO   81 mg





  DAILY MANDEEP   Administration





     





     





     





     


 


Atorvastatin Calcium  20 mg  06/22/20 21:00  06/25/20 20:14





  Lipitor  PO   20 mg





  HS MANDEEP   Administration





     





     





     





     


 


Clopidogrel Bisulfate  75 mg  06/22/20 09:00  06/26/20 08:42





  Plavix  PO   75 mg





  DAILY MANDEEP   Administration





     





     





     





     


 


Dexamethasone  6 mg  06/23/20 09:00  06/26/20 08:41





  Decadron  SLOW IVP   6 mg





  DAILY MANDEEP   Administration





     





     





     





     


 


Enoxaparin Sodium  60 mg  06/24/20 09:00  06/26/20 08:41





  Lovenox  SC   60 mg





  DAILY MANDEEP   Administration





     





     





     





     


 


Hydralazine HCl  10 mg  06/21/20 15:16  06/26/20 05:27





  Apresoline  SLOW IVP   10 mg





  Q4H PRN   Administration





  SBP > 180 and HR < 70   





     





     





     


 


Insulin Glargine 10 units/  0.1 mls @ 0 mls/hr  06/24/20 21:00  06/25/20 20:14





  Miscellaneous Medication  SC   Not Given





  [facility]   





     





     





     





     


 


Insulin Glargine 20 units/  0.2 mls @ 0 mls/hr  06/25/20 09:00  06/26/20 08:42





  Miscellaneous Medication  SC   0.2 mls





  QAM MANDEEP   Administration





     





     





     





     


 


Insulin Human Lispro  0 units  06/25/20 00:33  06/25/20 04:57





  Humalog  SC   10 unit





  .MODERATE SLIDING SC PRN   Administration





  Moderate Correctional Scale   





     





     





     


 


Insulin Human Lispro  0 units  06/25/20 00:33  06/25/20 00:47





  Humalog  SC   5 unit





  .BEDTIME SLIDING SC PRN   Administration





  Bedtime Correctional Scale   





     





     





     


 


Metoprolol Tartrate  25 mg  06/22/20 09:00  06/26/20 08:42





  Lopressor  PO   25 mg





  BID MANDEEP   Administration





     





     





     





     


 


Pantoprazole Sodium  40 mg  06/22/20 09:00  06/26/20 08:42





  Protonix  PO   40 mg





  DAILY MANDEEP   Administration





     





     





     





     


 


Sevelamer Carbonate  1,600 mg  06/22/20 08:00  06/26/20 13:48





  Renvela  PO   1,600 mg





  TID-WM MANDEEP   Administration





     





     





     





     


 


Sodium Chloride  10 ml  06/22/20 09:00  06/26/20 08:43





  Flush - Normal Saline  IVF   10 ml





  Q12HR MANDEEP   Administration





     





     





     





     














- Exam


Eye: PERRL, anicteric sclera


Heart: RRR, no murmur, no gallops, no rubs


Respiratory: rales (+ rales in both bases)


Gastrointestinal: soft, non-tender, non-distended, normal bowel sounds, no 

palpable masses, no hepatomegaly


Extremities: no cyanosis, no edema





Hosp A/P


(1) COVID-19 virus infection


Code(s): U07.1 - COVID-19   Status: Acute   





(2) Metabolic encephalopathy


Code(s): G93.41 - METABOLIC ENCEPHALOPATHY   Status: Acute   





(3) End stage renal disease on dialysis


Code(s): N18.6 - END STAGE RENAL DISEASE; Z99.2 - DEPENDENCE ON RENAL DIALYSIS 

  Status: Chronic   





(4) Hypertension


Code(s): I10 - ESSENTIAL (PRIMARY) HYPERTENSION   Status: Chronic   





(5) CAD (coronary artery disease)


Code(s): I25.10 - ATHSCL HEART DISEASE OF NATIVE CORONARY ARTERY W/O ANG PCTRS 

  Status: Chronic   





- Plan





* Metabolic encephalopathy- due to COVID pneumonia


* Will continue supportive care


* Continue Decadron IV


*  Inflammatory markers are trending up


* DM- blood glucose is stable


* CAD- stable- continue anticoagulation


* ESRD- continue dialysis as per Nephrology


* HTN- blood pressure is stable

## 2020-06-26 NOTE — PRG
DATE OF SERVICE:  06/26/2020



SUBJECTIVE:  Patient was seen and examined at bedside and overnight events noted. 



Patient denies any shortness of breath or chest pain or palpitation. 



No history of nausea or vomiting or diarrhea or fever or chills or cramps.



OBJECTIVE:  GENERAL:  This is a well built male, in no apparent distress. 

VITAL SIGNS:  Temperature 100.6.  Heart rate 87.  Respiratory rate 18.  Blood

pressure 159/69. 

HEENT:  Atraumatic, normocephalic.  Oral mucosa is moist 

NECK:  Supple. 

CARDIOVASCULAR:  S1, S2 heard.  Rate and rhythm regular. 

RESPIRATORY:  Clear to auscultation. 

GASTROINTESTINAL:  Abdomen is soft. 

MUSCULOSKELETAL:  No tenderness.  No edema. 

DERMATOLOGIC:  No skin rash. 

NEUROLOGIC:  Alert and awake and oriented X3.  No focal neurologic deficits. Moving

all the extremities. 

PSYCHIATRIC:  Mood and affect normal.



LABORATORY DATA:  Not done today.



ASSESSMENT AND PLAN:  

1. End-stage renal disease, continue on dialysis TTS as tolerated.

2. Hypertension.

3. Anemia.

4. Hyponatremia.

5. Altered mentation. 



Continue on dialysis TTS as tolerated.





Job ID:  [id number]

## 2020-06-27 LAB
ANION GAP SERPL CALC-SCNC: 15 MMOL/L (ref 10–20)
BUN SERPL-MCNC: 61 MG/DL (ref 8.4–25.7)
CALCIUM SERPL-MCNC: 9.3 MG/DL (ref 7.8–10.44)
CHLORIDE SERPL-SCNC: 97 MMOL/L (ref 98–107)
CO2 SERPL-SCNC: 30 MMOL/L (ref 23–31)
CREAT CL PREDICTED SERPL C-G-VRATE: 9 ML/MIN (ref 70–130)
GLUCOSE SERPL-MCNC: 54 MG/DL (ref 83–110)
HGB BLD-MCNC: 9.3 G/DL (ref 14–18)
PLATELET # BLD AUTO: 144 THOU/UL (ref 130–400)
POTASSIUM SERPL-SCNC: 3.9 MMOL/L (ref 3.5–5.1)
SODIUM SERPL-SCNC: 138 MMOL/L (ref 136–145)

## 2020-06-27 RX ADMIN — Medication SCH ML: at 20:21

## 2020-06-27 RX ADMIN — Medication SCH ML: at 09:23

## 2020-06-27 RX ADMIN — INSULIN GLARGINE SCH MLS: 100 INJECTION, SOLUTION SUBCUTANEOUS at 09:23

## 2020-06-27 RX ADMIN — ASPIRIN SCH MG: 81 TABLET ORAL at 09:23

## 2020-06-27 RX ADMIN — INSULIN GLARGINE SCH: 100 INJECTION, SOLUTION SUBCUTANEOUS at 22:15

## 2020-06-27 NOTE — PRG
DATE OF SERVICE:  06/27/2020



SUBJECTIVE:  The patient is on COVID isolation.



OBJECTIVE:  VITAL SIGNS:  Temperature 98.3, pulse 75, respiratory rate 20, blood

pressure 168/60. 



LABORATORY DATA:  Not done today.



ASSESSMENT AND PLAN:  

1. End-stage renal disease.  We will check labs today.

2. Edema, controlled.

3. Hypertension.

4. Chronic anemia. 



Recheck labs today.  Monitor labs.  Continue dialysis as tolerated.







Job ID:  [id number]

## 2020-06-28 PROCEDURE — 5A1D70Z PERFORMANCE OF URINARY FILTRATION, INTERMITTENT, LESS THAN 6 HOURS PER DAY: ICD-10-PCS | Performed by: INTERNAL MEDICINE

## 2020-06-28 RX ADMIN — HEPARIN SODIUM SCH UNITS: 5000 INJECTION, SOLUTION INTRAVENOUS; SUBCUTANEOUS at 20:04

## 2020-06-28 RX ADMIN — INSULIN GLARGINE SCH: 100 INJECTION, SOLUTION SUBCUTANEOUS at 08:48

## 2020-06-28 RX ADMIN — Medication SCH ML: at 20:04

## 2020-06-28 RX ADMIN — INSULIN GLARGINE SCH: 100 INJECTION, SOLUTION SUBCUTANEOUS at 20:52

## 2020-06-28 RX ADMIN — Medication SCH ML: at 08:49

## 2020-06-28 RX ADMIN — ASPIRIN SCH MG: 81 TABLET ORAL at 08:48

## 2020-06-28 NOTE — PRG
DATE OF SERVICE:  



SUBJECTIVE:  The patient is on COVID isolation.



OBJECTIVE:  VITAL SIGNS:  Temperature 98.3, pulse __________, respiratory rate 20,

blood pressure 151/60. 



LABORATORY DATA:  Not done today.



ASSESSMENT AND PLAN:  

1. End-stage renal disease.  Continue dialysis.

2. Edema, controlled.

3. Hypertension.

4. Anemia of chronic disease.

5. Monitor labs.  Continue dialysis as tolerated on Tuesday, Thursday, Saturday.







Job ID:  [id number]

## 2020-06-28 NOTE — PDOC.HOSPP
- Subjective


Encounter Date: 06/28/20


Encounter Time: 16:03


Subjective: 





Mr. Reyes was seen today in follow-up of COVID infection and metabolic 

encephalopathy. He is a bit confused again today. He is asking for his shirt 

and a jacket. He denies feeling short of breath.





- Objective


Vital Signs & Weight: 


 Vital Signs (12 hours)











  Temp Pulse Resp BP BP BP Pulse Ox


 


 06/28/20 12:00  98.3 F  66  20    151/68 H  100


 


 06/28/20 08:00  98.3 F  68  24 H  163/72 H    98


 


 06/28/20 04:36   74    147/64 H  


 


 06/28/20 04:12   75     


 


 06/28/20 04:08  98.2 F  71  20    188/79 H  100








 Weight











Admit Weight                   135 lb 14.287 oz


 


Weight                         132 lb 15.02 oz














I&O: 


 











 06/27/20 06/28/20 06/29/20





 06:59 06:59 06:59


 


Intake Total 520 780 


 


Output Total  1500 


 


Balance 520 -720 











Result Diagrams: 


 06/27/20 15:25





 06/27/20 16:55


Additional Labs: 


 Accuchecks











  06/28/20 06/28/20 06/28/20





  12:08 04:44 04:08


 


POC Glucose  98  85  61 L














  06/27/20 06/27/20 06/27/20





  20:35 18:30 17:24


 


POC Glucose  96  148 H  66 L














Hospitalist ROS





- Medication


Medications: 


Active Medications











Generic Name Dose Route Start Last Admin





  Trade Name Freq  PRN Reason Stop Dose Admin


 


Acetaminophen  650 mg  06/21/20 15:16  06/28/20 04:27





  Tylenol  PO   650 mg





  Q4H PRN   Administration





  Headache/Fever/Mild Pain (1-3)   





     





     





     


 


Aspirin  81 mg  06/22/20 09:00  06/28/20 08:48





  Ecotrin  PO   81 mg





  DAILY MANDEEP   Administration





     





     





     





     


 


Atorvastatin Calcium  20 mg  06/22/20 21:00  06/27/20 20:20





  Lipitor  PO   20 mg





  HS MANDEEP   Administration





     





     





     





     


 


Clopidogrel Bisulfate  75 mg  06/22/20 09:00  06/28/20 08:48





  Plavix  PO   75 mg





  DAILY MANDEEP   Administration





     





     





     





     


 


Dextrose/Water  25 gm  06/25/20 00:33  06/27/20 17:56





  Dextrose 50%  SLOW IVP   25 gm





  PRN PRN   Administration





  Hypoglycemia   





     





     





     


 


Enoxaparin Sodium  60 mg  06/24/20 09:00  06/28/20 08:47





  Lovenox  SC   60 mg





  DAILY MANDEEP   Administration





     





     





     





     


 


Hydralazine HCl  10 mg  06/21/20 15:16  06/28/20 04:12





  Apresoline  SLOW IVP   10 mg





  Q4H PRN   Administration





  SBP > 180 and HR < 70   





     





     





     


 


Hydralazine HCl  25 mg  06/26/20 07:34  06/27/20 23:47





  Apresoline  PO   25 mg





  TID PRN   Administration





  SBP Greater Than 170   





     





     





     


 


Insulin Glargine 5 units/  0.05 mls @ 0 mls/hr  06/27/20 21:00  06/27/20 22:15





  Miscellaneous Medication  SC   Not Given





  HS MANDEEP   





     





     





     





     


 


Insulin Glargine 10 units/  0.1 mls @ 0 mls/hr  06/28/20 09:00  06/28/20 08:48





  Miscellaneous Medication  SC   Not Given





  QAM MANDEEP   





     





     





     





     


 


Insulin Human Lispro  0 units  06/25/20 00:33  06/26/20 18:25





  Humalog  SC   4 unit





  .MODERATE SLIDING SC PRN   Administration





  Moderate Correctional Scale   





     





     





     


 


Insulin Human Lispro  0 units  06/25/20 00:33  06/25/20 00:47





  Humalog  SC   5 unit





  .BEDTIME SLIDING SC PRN   Administration





  Bedtime Correctional Scale   





     





     





     


 


Metoprolol Tartrate  25 mg  06/22/20 09:00  06/28/20 08:47





  Lopressor  PO   25 mg





  BID MANDEEP   Administration





     





     





     





     


 


Pantoprazole Sodium  40 mg  06/22/20 09:00  06/28/20 08:47





  Protonix  PO   40 mg





  DAILY MANDEEP   Administration





     





     





     





     


 


Sevelamer Carbonate  1,600 mg  06/22/20 08:00  06/28/20 11:46





  Renvela  PO   1,600 mg





  TID-WM MANDEEP   Administration





     





     





     





     


 


Sodium Chloride  10 ml  06/22/20 09:00  06/28/20 08:49





  Flush - Normal Saline  IVF   10 ml





  Q12HR MANDEEP   Administration





     





     





     





     














- Exam


Eye: PERRL, anicteric sclera


Heart: RRR, no murmur, no gallops, no rubs, normal peripheral pulses


Respiratory: CTAB, no wheezes, no rales, normal chest expansion


Gastrointestinal: soft, non-tender, non-distended, normal bowel sounds, no 

palpable masses


Extremities: no cyanosis, no edema





Hosp A/P


(1) COVID-19 virus infection


Code(s): U07.1 - COVID-19   Status: Acute   





(2) Metabolic encephalopathy


Code(s): G93.41 - METABOLIC ENCEPHALOPATHY   Status: Acute   





(3) End stage renal disease on dialysis


Code(s): N18.6 - END STAGE RENAL DISEASE; Z99.2 - DEPENDENCE ON RENAL DIALYSIS 

  Status: Chronic   





(4) Hypertension


Code(s): I10 - ESSENTIAL (PRIMARY) HYPERTENSION   Status: Chronic   





(5) CAD (coronary artery disease)


Code(s): I25.10 - ATHSCL HEART DISEASE OF NATIVE CORONARY ARTERY W/O ANG PCTRS 

  Status: Chronic   





- Plan





* Metabolic encephalopathy- due to COVID pneumonia


* Continue Decadron IV


*  Inflammatory markers 


* Continue other supportive care


* DM- blood glucose is stable


* CAD- stable- patient is confused and at times tryng to get out of bed- will 

change to Heparin SC( DVT prophylaxis dose )


* ESRD- continue dialysis as per Nephrology


* HTN- blood pressure is now a bit labile- will monitor and continue PRN 

medications

## 2020-06-28 NOTE — EKG
Test Reason : SOB FEVER

Blood Pressure : ***/*** mmHG

Vent. Rate : 088 BPM     Atrial Rate : 088 BPM

   P-R Int : 000 ms          QRS Dur : 088 ms

    QT Int : 394 ms       P-R-T Axes : 000 -41 111 degrees

   QTc Int : 476 ms

 

Accelerated Junctional rhythm

Left axis deviation

Left ventricular hypertrophy with repolarization abnormality

Inferior infarct , age undetermined

Abnormal ECG

 

Confirmed by JOSE KAUR ([id number]),  TIESHA ANTONY ([id number]) on 6/28/2020 12:18:54 PM

 

Referred By:             Confirmed By:JOSE KAUR

## 2020-06-29 LAB
HGB BLD-MCNC: 9 G/DL (ref 14–18)
PLATELET # BLD AUTO: 120 THOU/UL (ref 130–400)

## 2020-06-29 RX ADMIN — ASPIRIN SCH MG: 81 TABLET ORAL at 09:56

## 2020-06-29 RX ADMIN — INSULIN GLARGINE SCH: 100 INJECTION, SOLUTION SUBCUTANEOUS at 22:02

## 2020-06-29 RX ADMIN — HEPARIN SODIUM SCH UNITS: 5000 INJECTION, SOLUTION INTRAVENOUS; SUBCUTANEOUS at 20:17

## 2020-06-29 RX ADMIN — HEPARIN SODIUM SCH UNITS: 5000 INJECTION, SOLUTION INTRAVENOUS; SUBCUTANEOUS at 09:56

## 2020-06-29 RX ADMIN — Medication SCH ML: at 20:55

## 2020-06-29 RX ADMIN — INSULIN GLARGINE SCH: 100 INJECTION, SOLUTION SUBCUTANEOUS at 08:35

## 2020-06-29 RX ADMIN — Medication SCH ML: at 09:57

## 2020-06-29 NOTE — PRG
DATE OF SERVICE:  06/29/2020



SUBJECTIVE:  A 75-year-old gentleman, being seen for end-stage renal disease.  The

patient denies any nausea, vomiting, or chest pain. 



OBJECTIVE:  GENERAL:  The patient is awake and alert. 

VITAL SIGNS:  Afebrile, pulse 75, breathing at 16, blood pressure 152/67. 

HEENT:  Head normocephalic and atraumatic.  Eyes intact, no ulcers.  Nose intact, no

ulcers.  Ears intact, no ulcers. 

NECK:  Supple.  No JVD. 

CHEST:  Symmetrical and clear. 

CARDIOVASCULAR:  Shows S1 and S2, no rub, no murmur. 

GASTROINTESTINAL:  Abdomen is soft, bowel sounds positive. 

EXTREMITIES:  Show no edema or ulcers. 

SKIN:  Shows no rash or petechiae. 

MUSCULOSKELETAL:  Shows no joint swelling or stiffness. 

GENITOURINARY:  Shows no Cedeno or CVA tenderness. 

NEUROLOGIC:  Motor intact.  Cranial nerves intact.



ASSESSMENT AND PLAN:  

1. End-stage renal disease, plan dialysis.

2. Hypertension, stable.

3. Anemia, stable. 

Medication based on GFR appropriate.







Job ID:  [id number]

## 2020-06-29 NOTE — PDOC.HOSPP
- Subjective


Encounter Date: 06/29/20


Encounter Time: 11:00


Subjective: 





no sob or chest pain


does not interact much


is not eating much per staff





- Objective


Vital Signs & Weight: 


 Vital Signs (12 hours)











  Temp Pulse Resp BP BP Pulse Ox


 


 06/29/20 11:52     152/70 H  


 


 06/29/20 11:05  98.6 F  71  18   152/67 H  100


 


 06/29/20 10:39  98.3 F  78  20   161/69 H  100


 


 06/29/20 04:00  98.9 F  77  20   184/80 H  96








 Weight











Admit Weight                   135 lb 14.287 oz


 


Weight                         132 lb 15.02 oz














I&O: 


 











 06/28/20 06/29/20 06/30/20





 06:59 06:59 06:59


 


Intake Total 780 740 


 


Output Total 1500 0 


 


Balance -720 740 











Result Diagrams: 


 06/27/20 15:25





 06/27/20 16:55


Additional Labs: 


 Accuchecks











  06/29/20 06/28/20 06/28/20





  04:11 20:19 19:00


 


POC Glucose  108  118 H  104














Hospitalist ROS





- Medication


Medications: 


Active Medications











Generic Name Dose Route Start Last Admin





  Trade Name Freq  PRN Reason Stop Dose Admin


 


Acetaminophen  650 mg  06/21/20 15:16  06/28/20 04:27





  Tylenol  PO   650 mg





  Q4H PRN   Administration





  Headache/Fever/Mild Pain (1-3)   





     





     





     


 


Aspirin  81 mg  06/22/20 09:00  06/29/20 09:56





  Ecotrin  PO   81 mg





  DAILY MADNEEP   Administration





     





     





     





     


 


Atorvastatin Calcium  20 mg  06/22/20 21:00  06/28/20 20:03





  Lipitor  PO   20 mg





  HS MANDEEP   Administration





     





     





     





     


 


Clopidogrel Bisulfate  75 mg  06/22/20 09:00  06/29/20 09:55





  Plavix  PO   75 mg





  DAILY MANDEEP   Administration





     





     





     





     


 


Dextrose/Water  25 gm  06/25/20 00:33  06/27/20 17:56





  Dextrose 50%  SLOW IVP   25 gm





  PRN PRN   Administration





  Hypoglycemia   





     





     





     


 


Heparin Sodium (Porcine)  5,000 units  06/28/20 21:00  06/29/20 09:56





  Heparin  SC   5,000 units





  BID MANDEEP   Administration





     





     





     





     


 


Hydralazine HCl  10 mg  06/21/20 15:16  06/29/20 10:16





  Apresoline  SLOW IVP   10 mg





  Q4H PRN   Administration





  SBP > 180 and HR < 70   





     





     





     


 


Hydralazine HCl  25 mg  06/26/20 07:34  06/28/20 23:41





  Apresoline  PO   25 mg





  TID PRN   Administration





  SBP Greater Than 170   





     





     





     


 


Insulin Glargine 5 units/  0.05 mls @ 0 mls/hr  06/27/20 21:00  06/28/20 20:52





  Miscellaneous Medication  SC   Not Given





  HS MANDEEP   





     





     





     





     


 


Insulin Glargine 10 units/  0.1 mls @ 0 mls/hr  06/28/20 09:00  06/29/20 08:35





  Miscellaneous Medication  SC   Not Given





  QAM MANDEEP   





     





     





     





     


 


Insulin Human Lispro  0 units  06/25/20 00:33  06/26/20 18:25





  Humalog  SC   4 unit





  .MODERATE SLIDING SC PRN   Administration





  Moderate Correctional Scale   





     





     





     


 


Insulin Human Lispro  0 units  06/25/20 00:33  06/25/20 00:47





  Humalog  SC   5 unit





  .BEDTIME SLIDING SC PRN   Administration





  Bedtime Correctional Scale   





     





     





     


 


Metoprolol Tartrate  25 mg  06/22/20 09:00  06/29/20 09:55





  Lopressor  PO   25 mg





  BID MANDEEP   Administration





     





     





     





     


 


Pantoprazole Sodium  40 mg  06/22/20 09:00  06/29/20 09:55





  Protonix  PO   40 mg





  DAILY MANDEEP   Administration





     





     





     





     


 


Sevelamer Carbonate  1,600 mg  06/22/20 08:00  06/29/20 13:08





  Renvela  PO   1,600 mg





  TID-WM MANDEEP   Administration





     





     





     





     


 


Sodium Chloride  10 ml  06/22/20 09:00  06/29/20 09:57





  Flush - Normal Saline  IVF   10 ml





  Q12HR MANDEEP   Administration





     





     





     





     














- Exam


Eye: PERRL, anicteric sclera


ENT: no oropharyngeal lesions, moist mucosa


Neck: supple, no JVD


Heart: RRR, no murmur


Respiratory: no wheezes, no rales


Gastrointestinal: soft, non-tender, non-distended, normal bowel sounds


Extremities: no cyanosis, no edema


Neurological: cranial nerve grossly intact, no focal deficits





Hosp A/P


(1) Pneumonia due to COVID-19 virus


Code(s): U07.1 - COVID-19; J12.89 - OTHER VIRAL PNEUMONIA   Status: Acute   





(2) Metabolic encephalopathy


Code(s): G93.41 - METABOLIC ENCEPHALOPATHY   Status: Acute   





(3) CAD (coronary artery disease)


Code(s): I25.10 - ATHSCL HEART DISEASE OF NATIVE CORONARY ARTERY W/O ANG PCTRS 

  Status: Chronic   


Qualifiers: 


   Coronary Disease-Associated Artery/Lesion type: native artery   Native vs. 

transplanted heart: native heart   Associated angina: without angina   

Qualified Code(s): I25.10 - Atherosclerotic heart disease of native coronary 

artery without angina pectoris   





(4) End stage renal disease on dialysis


Code(s): N18.6 - END STAGE RENAL DISEASE; Z99.2 - DEPENDENCE ON RENAL DIALYSIS 

  Status: Chronic   





(5) Hypertension


Code(s): I10 - ESSENTIAL (PRIMARY) HYPERTENSION   Status: Chronic   


Qualifiers: 


   Hypertension type: essential hypertension   Qualified Code(s): I10 - 

Essential (primary) hypertension   





- Plan





is on asp, lipitor, plavix, lantus, lopressor, protonix and renvela


is off steroids


dc plan is to home with HH, speech, PT and OT per CM


prognosis guarded


trial of reg diet to see if he eats well?

## 2020-06-30 RX ADMIN — ASPIRIN SCH MG: 81 TABLET ORAL at 09:09

## 2020-06-30 RX ADMIN — INSULIN GLARGINE SCH: 100 INJECTION, SOLUTION SUBCUTANEOUS at 23:39

## 2020-06-30 RX ADMIN — Medication SCH ML: at 09:10

## 2020-06-30 RX ADMIN — HEPARIN SODIUM SCH UNITS: 5000 INJECTION, SOLUTION INTRAVENOUS; SUBCUTANEOUS at 09:09

## 2020-06-30 RX ADMIN — INSULIN GLARGINE SCH MLS: 100 INJECTION, SOLUTION SUBCUTANEOUS at 09:09

## 2020-06-30 RX ADMIN — Medication SCH ML: at 23:39

## 2020-06-30 RX ADMIN — HEPARIN SODIUM SCH UNITS: 5000 INJECTION, SOLUTION INTRAVENOUS; SUBCUTANEOUS at 23:13

## 2020-06-30 NOTE — PRG
DATE OF SERVICE:  06/30/2020



SUBJECT:  A 75-year-old gentleman being seen for end-stage renal disease.  The

patient denied any nausea, vomiting, or chest pain. 



OBJECTIVE:  GENERAL:  The patient is awake and alert. 

VITAL SIGNS:  Afebrile, pulse 99, breathing 16, blood pressure 120/70. 

HEENT:  Head normocephalic and atraumatic.  Eyes intact, no ulcers.  Nose intact, no

ulcers.  Ears intact, no ulcers. 

NECK:  Supple.  No JVD. 

CHEST:  Symmetrical and clear. 

CARDIOVASCULAR:  Shows S1 and S2, no rub, no murmur. 

GASTROINTESTINAL:  Abdomen is soft, bowel sounds positive. 

EXTREMITIES:  Show no edema or ulcers. 

SKIN:  Shows no rash or petechiae. 

MUSCULOSKELETAL:  Shows no joint swelling or stiffness. 

GENITOURINARY:  Shows no Cedeno or CVA tenderness. 

NEUROLOGIC:  Motor intact.  Cranial nerves intact.



LABORATORY DATA:  Hemoglobin 9.



ASSESSMENT AND PLAN:  

1. Stage 3 chronic renal disease, plan dialysis.

2. Hypertension, stable.

3. Anemia, stable.

4. Medication based on GFR appropriate.







Job ID:  [id number]

## 2020-06-30 NOTE — PDOC.HOSPP
- Subjective


Encounter Date: 06/30/20


Encounter Time: 09:45


Subjective: 





no sob, not fully oriented





- Objective


Vital Signs & Weight: 


 Vital Signs (12 hours)











  Temp Pulse Resp BP BP BP BP


 


 06/30/20 12:37  98.5 F  71  16    156/67 H 


 


 06/30/20 09:20      120/70  


 


 06/30/20 09:08  99.0 F  80  16     178/77 H


 


 06/30/20 05:10   75      178/78 H


 


 06/30/20 03:08   74   193/76 H   


 


 06/30/20 03:06  98.2 F  74  18     193/76 H














  Pulse Ox


 


 06/30/20 12:37  98


 


 06/30/20 09:20 


 


 06/30/20 09:08  99


 


 06/30/20 05:10 


 


 06/30/20 03:08 


 


 06/30/20 03:06  100








 Weight











Admit Weight                   135 lb 14.287 oz


 


Weight                         139 lb 1.787 oz














I&O: 


 











 06/29/20 06/30/20 07/01/20





 06:59 06:59 06:59


 


Intake Total 740 440 170


 


Output Total 0 0 


 


Balance 740 440 170











Result Diagrams: 


 06/29/20 15:03





 06/27/20 16:55


Additional Labs: 


 Accuchecks











  06/30/20 06/30/20 06/29/20





  12:20 05:10 20:27


 


POC Glucose  129 H  135 H  152 H














  06/29/20 06/29/20 06/29/20





  16:52 13:17 13:17


 


POC Glucose  149 H  156 H  156 H














  06/29/20 06/29/20 06/29/20





  13:17 13:17 13:17


 


POC Glucose  156 H  156 H  156 H














  06/29/20





  13:17


 


POC Glucose  156 H














Hospitalist ROS





- Medication


Medications: 


Active Medications











Generic Name Dose Route Start Last Admin





  Trade Name Freq  PRN Reason Stop Dose Admin


 


Acetaminophen  650 mg  06/21/20 15:16  06/30/20 12:37





  Tylenol  PO   650 mg





  Q4H PRN   Administration





  Headache/Fever/Mild Pain (1-3)   





     





     





     


 


Aspirin  81 mg  06/22/20 09:00  06/30/20 09:09





  Ecotrin  PO   81 mg





  DAILY MANDEEP   Administration





     





     





     





     


 


Atorvastatin Calcium  20 mg  06/22/20 21:00  06/29/20 20:17





  Lipitor  PO   20 mg





  HS MANDEEP   Administration





     





     





     





     


 


Clopidogrel Bisulfate  75 mg  06/22/20 09:00  06/30/20 09:08





  Plavix  PO   75 mg





  DAILY MANDEEP   Administration





     





     





     





     


 


Dextrose/Water  25 gm  06/25/20 00:33  06/27/20 17:56





  Dextrose 50%  SLOW IVP   25 gm





  PRN PRN   Administration





  Hypoglycemia   





     





     





     


 


Heparin Sodium (Porcine)  5,000 units  06/28/20 21:00  06/30/20 09:09





  Heparin  SC   5,000 units





  BID MANDEEP   Administration





     





     





     





     


 


Hydralazine HCl  10 mg  06/21/20 15:16  06/30/20 03:08





  Apresoline  SLOW IVP   10 mg





  Q4H PRN   Administration





  SBP > 180 and HR < 70   





     





     





     


 


Hydralazine HCl  25 mg  06/26/20 07:34  06/28/20 23:41





  Apresoline  PO   25 mg





  TID PRN   Administration





  SBP Greater Than 170   





     





     





     


 


Insulin Glargine 5 units/  0.05 mls @ 0 mls/hr  06/27/20 21:00  06/29/20 22:02





  Miscellaneous Medication  SC   Not Given





  HS MANDEEP   





     





     





     





     


 


Insulin Glargine 10 units/  0.1 mls @ 0 mls/hr  06/28/20 09:00  06/30/20 09:09





  Miscellaneous Medication  SC   0.1 mls





  QAM MANDEEP   Administration





     





     





     





     


 


Insulin Human Lispro  0 units  06/25/20 00:33  06/26/20 18:25





  Humalog  SC   4 unit





  .MODERATE SLIDING SC PRN   Administration





  Moderate Correctional Scale   





     





     





     


 


Insulin Human Lispro  0 units  06/25/20 00:33  06/25/20 00:47





  Humalog  SC   5 unit





  .BEDTIME SLIDING SC PRN   Administration





  Bedtime Correctional Scale   





     





     





     


 


Metoprolol Tartrate  25 mg  06/22/20 09:00  06/30/20 09:08





  Lopressor  PO   25 mg





  BID MANDEEP   Administration





     





     





     





     


 


Pantoprazole Sodium  40 mg  06/22/20 09:00  06/30/20 09:08





  Protonix  PO   40 mg





  DAILY MANDEEP   Administration





     





     





     





     


 


Sevelamer Carbonate  1,600 mg  06/22/20 08:00  06/30/20 12:37





  Renvela  PO   1,600 mg





  TID-WM MANDEEP   Administration





     





     





     





     


 


Sodium Chloride  10 ml  06/22/20 09:00  06/30/20 09:10





  Flush - Normal Saline  IVF   10 ml





  Q12HR MANDEEP   Administration





     





     





     





     














- Exam


General Appearance: awake alert


Eye: PERRL, anicteric sclera


ENT: no oropharyngeal lesions, moist mucosa


Neck: supple, no JVD


Heart: RRR, no murmur


Respiratory: no wheezes, no rales


Gastrointestinal: soft, non-tender, non-distended, normal bowel sounds


Extremities: no cyanosis, no edema


Neurological: cranial nerve grossly intact, no focal deficits





Hosp A/P


(1) Pneumonia due to COVID-19 virus


Code(s): U07.1 - COVID-19; J12.89 - OTHER VIRAL PNEUMONIA   Status: Acute   





(2) Metabolic encephalopathy


Code(s): G93.41 - METABOLIC ENCEPHALOPATHY   Status: Acute   





(3) CAD (coronary artery disease)


Code(s): I25.10 - ATHSCL HEART DISEASE OF NATIVE CORONARY ARTERY W/O ANG PCTRS 

  Status: Chronic   


Qualifiers: 


   Coronary Disease-Associated Artery/Lesion type: native artery   Native vs. 

transplanted heart: native heart   Associated angina: without angina   

Qualified Code(s): I25.10 - Atherosclerotic heart disease of native coronary 

artery without angina pectoris   





(4) End stage renal disease on dialysis


Code(s): N18.6 - END STAGE RENAL DISEASE; Z99.2 - DEPENDENCE ON RENAL DIALYSIS 

  Status: Chronic   





(5) Hypertension


Code(s): I10 - ESSENTIAL (PRIMARY) HYPERTENSION   Status: Chronic   


Qualifiers: 


   Hypertension type: essential hypertension   Qualified Code(s): I10 - 

Essential (primary) hypertension   





- Plan





is on asp, lipitor, plavix, lantus, lopressor, protonix and renvela


is off steroids


dc plan is to home with HH, speech, PT and OT per CM, not sure if he can manage 

himself at home?


prognosis guarded


trial of reg diet is on.

## 2020-07-01 LAB
ANION GAP SERPL CALC-SCNC: 12 MMOL/L (ref 10–20)
BASOPHILS # BLD AUTO: 0 THOU/UL (ref 0–0.2)
BASOPHILS NFR BLD AUTO: 0.1 % (ref 0–1)
BUN SERPL-MCNC: 30 MG/DL (ref 8.4–25.7)
CALCIUM SERPL-MCNC: 8.9 MG/DL (ref 7.8–10.44)
CHLORIDE SERPL-SCNC: 96 MMOL/L (ref 98–107)
CO2 SERPL-SCNC: 28 MMOL/L (ref 23–31)
CREAT CL PREDICTED SERPL C-G-VRATE: 13 ML/MIN (ref 70–130)
EOSINOPHIL # BLD AUTO: 0.1 THOU/UL (ref 0–0.7)
EOSINOPHIL NFR BLD AUTO: 2.2 % (ref 0–10)
GLUCOSE SERPL-MCNC: 146 MG/DL (ref 83–110)
HGB BLD-MCNC: 8.2 G/DL (ref 14–18)
HGB BLD-MCNC: 8.2 G/DL (ref 14–18)
LYMPHOCYTES # BLD: 0.7 THOU/UL (ref 1.2–3.4)
LYMPHOCYTES NFR BLD AUTO: 18.8 % (ref 21–51)
MCH RBC QN AUTO: 33 PG (ref 27–31)
MCV RBC AUTO: 102 FL (ref 78–98)
MONOCYTES # BLD AUTO: 0.3 THOU/UL (ref 0.11–0.59)
MONOCYTES NFR BLD AUTO: 7.3 % (ref 0–10)
NEUTROPHILS # BLD AUTO: 2.7 THOU/UL (ref 1.4–6.5)
NEUTROPHILS NFR BLD AUTO: 71.6 % (ref 42–75)
PLATELET # BLD AUTO: 154 THOU/UL (ref 130–400)
PLATELET # BLD AUTO: 161 THOU/UL (ref 130–400)
POTASSIUM SERPL-SCNC: 4.3 MMOL/L (ref 3.5–5.1)
RBC # BLD AUTO: 2.49 MILL/UL (ref 4.7–6.1)
SODIUM SERPL-SCNC: 132 MMOL/L (ref 136–145)
WBC # BLD AUTO: 3.8 THOU/UL (ref 4.8–10.8)

## 2020-07-01 RX ADMIN — Medication SCH ML: at 08:55

## 2020-07-01 RX ADMIN — HEPARIN SODIUM SCH UNITS: 5000 INJECTION, SOLUTION INTRAVENOUS; SUBCUTANEOUS at 08:54

## 2020-07-01 RX ADMIN — ASPIRIN SCH MG: 81 TABLET ORAL at 08:55

## 2020-07-01 RX ADMIN — INSULIN GLARGINE SCH: 100 INJECTION, SOLUTION SUBCUTANEOUS at 23:12

## 2020-07-01 RX ADMIN — Medication SCH ML: at 21:41

## 2020-07-01 RX ADMIN — INSULIN GLARGINE SCH: 100 INJECTION, SOLUTION SUBCUTANEOUS at 09:56

## 2020-07-01 RX ADMIN — HEPARIN SODIUM SCH UNITS: 5000 INJECTION, SOLUTION INTRAVENOUS; SUBCUTANEOUS at 21:41

## 2020-07-01 NOTE — PDOC.HOSPP
- Subjective


Encounter Date: 07/01/20


Encounter Time: 10:00


Subjective: 





awake, not in distress


is not eating much





- Objective


Vital Signs & Weight: 


 Vital Signs (12 hours)











  Temp Pulse Resp BP Pulse Ox


 


 07/01/20 11:34  98.6 F  65  40 H  168/72 H  99


 


 07/01/20 09:09  98.9 F  72  24 H  177/77 H  96


 


 07/01/20 04:00  99.0 F  75  16  160/70 H  93 L








 Weight











Admit Weight                   135 lb 14.287 oz


 


Weight                         137 lb 3.2 oz














I&O: 


 











 06/30/20 07/01/20 07/02/20





 06:59 06:59 06:59


 


Intake Total 440 295 


 


Output Total 0 0 


 


Balance 440 295 











Result Diagrams: 


 07/01/20 04:49





 07/01/20 04:49


Additional Labs: 


 Accuchecks











  07/01/20 06/30/20 06/30/20





  05:19 23:23 15:30


 


POC Glucose  154 H  110  106














Hospitalist ROS





- Medication


Medications: 


Active Medications











Generic Name Dose Route Start Last Admin





  Trade Name Freq  PRN Reason Stop Dose Admin


 


Acetaminophen  650 mg  06/21/20 15:16  06/30/20 17:38





  Tylenol  PO   650 mg





  Q4H PRN   Administration





  Headache/Fever/Mild Pain (1-3)   





     





     





     


 


Aspirin  81 mg  06/22/20 09:00  07/01/20 08:55





  Ecotrin  PO   81 mg





  DAILY MANDEEP   Administration





     





     





     





     


 


Atorvastatin Calcium  20 mg  06/22/20 21:00  06/30/20 23:13





  Lipitor  PO   20 mg





  HS MANDEEP   Administration





     





     





     





     


 


Clopidogrel Bisulfate  75 mg  06/22/20 09:00  07/01/20 08:55





  Plavix  PO   75 mg





  DAILY MANDEEP   Administration





     





     





     





     


 


Dextrose/Water  25 gm  06/25/20 00:33  06/27/20 17:56





  Dextrose 50%  SLOW IVP   25 gm





  PRN PRN   Administration





  Hypoglycemia   





     





     





     


 


Heparin Sodium (Porcine)  5,000 units  06/28/20 21:00  07/01/20 08:54





  Heparin  SC   5,000 units





  BID MANDEEP   Administration





     





     





     





     


 


Hydralazine HCl  10 mg  06/21/20 15:16  06/30/20 03:08





  Apresoline  SLOW IVP   10 mg





  Q4H PRN   Administration





  SBP > 180 and HR < 70   





     





     





     


 


Hydralazine HCl  25 mg  06/26/20 07:34  06/28/20 23:41





  Apresoline  PO   25 mg





  TID PRN   Administration





  SBP Greater Than 170   





     





     





     


 


Insulin Glargine 5 units/  0.05 mls @ 0 mls/hr  06/27/20 21:00  06/30/20 23:39





  Miscellaneous Medication  SC   Not Given





  HS MANDEEP   





     





     





     





     


 


Insulin Glargine 10 units/  0.1 mls @ 0 mls/hr  06/28/20 09:00  07/01/20 09:56





  Miscellaneous Medication  SC   Not Given





  QAM MANDEEP   





     





     





     





     


 


Insulin Human Lispro  0 units  06/25/20 00:33  06/26/20 18:25





  Humalog  SC   4 unit





  .MODERATE SLIDING SC PRN   Administration





  Moderate Correctional Scale   





     





     





     


 


Insulin Human Lispro  0 units  06/25/20 00:33  06/25/20 00:47





  Humalog  SC   5 unit





  .BEDTIME SLIDING SC PRN   Administration





  Bedtime Correctional Scale   





     





     





     


 


Metoprolol Tartrate  25 mg  06/22/20 09:00  07/01/20 08:55





  Lopressor  PO   25 mg





  BID MANDEEP   Administration





     





     





     





     


 


Pantoprazole Sodium  40 mg  06/22/20 09:00  07/01/20 08:55





  Protonix  PO   40 mg





  DAILY MANDEEP   Administration





     





     





     





     


 


Sevelamer Carbonate  1,600 mg  06/22/20 08:00  07/01/20 12:38





  Renvela  PO   Not Given





  TID-WM MANDEEP   





     





     





     





     


 


Sodium Chloride  10 ml  06/22/20 09:00  07/01/20 08:55





  Flush - Normal Saline  IVF   10 ml





  Q12HR MANDEEP   Administration





     





     





     





     














- Exam


General Appearance: awake alert


Eye: anicteric sclera


ENT: no oropharyngeal lesions, dry oral mucosa


Neck: supple, no JVD


Heart: RRR, no murmur


Respiratory: no wheezes, no rales


Gastrointestinal: soft, non-tender, non-distended, normal bowel sounds


Extremities: no cyanosis, no edema


Neurological: cranial nerve grossly intact, no focal deficits





Hosp A/P


(1) Pneumonia due to COVID-19 virus


Code(s): U07.1 - COVID-19; J12.89 - OTHER VIRAL PNEUMONIA   Status: Acute   





(2) Metabolic encephalopathy


Code(s): G93.41 - METABOLIC ENCEPHALOPATHY   Status: Acute   





(3) CAD (coronary artery disease)


Code(s): I25.10 - ATHSCL HEART DISEASE OF NATIVE CORONARY ARTERY W/O ANG PCTRS 

  Status: Chronic   


Qualifiers: 


   Coronary Disease-Associated Artery/Lesion type: native artery   Native vs. 

transplanted heart: native heart   Associated angina: without angina   

Qualified Code(s): I25.10 - Atherosclerotic heart disease of native coronary 

artery without angina pectoris   





(4) End stage renal disease on dialysis


Code(s): N18.6 - END STAGE RENAL DISEASE; Z99.2 - DEPENDENCE ON RENAL DIALYSIS 

  Status: Chronic   





(5) Hypertension


Code(s): I10 - ESSENTIAL (PRIMARY) HYPERTENSION   Status: Chronic   


Qualifiers: 


   Hypertension type: essential hypertension   Qualified Code(s): I10 - 

Essential (primary) hypertension   





- Plan





is on asp, lipitor, plavix, lantus, lopressor, protonix and renvela


is off steroids


dc plan is to home with HH, speech, PT and OT per CM, not sure if he can manage 

himself at home?


prognosis guarded


trial of reg diet is on.


May dc home if his outpt HD is arranged.


At baseline he is wheel chair bound per staff

## 2020-07-02 VITALS — DIASTOLIC BLOOD PRESSURE: 53 MMHG | TEMPERATURE: 97.7 F | SYSTOLIC BLOOD PRESSURE: 121 MMHG

## 2020-07-02 RX ADMIN — ASPIRIN SCH MG: 81 TABLET ORAL at 08:28

## 2020-07-02 RX ADMIN — Medication SCH ML: at 08:28

## 2020-07-02 RX ADMIN — INSULIN GLARGINE SCH: 100 INJECTION, SOLUTION SUBCUTANEOUS at 08:28

## 2020-07-02 RX ADMIN — HEPARIN SODIUM SCH UNITS: 5000 INJECTION, SOLUTION INTRAVENOUS; SUBCUTANEOUS at 08:28

## 2020-07-02 NOTE — PDOC.HOSPP
- Subjective


Encounter Date: 07/02/20


Encounter Time: 09:15


Subjective: 





is getting HD, awakens easily


not in distress


still eating poorly





- Objective


Vital Signs & Weight: 


 Vital Signs (12 hours)











  Temp Pulse Resp BP Pulse Ox


 


 07/02/20 13:00  97.7 F  73  14  121/53 L  100


 


 07/02/20 08:40  98.3 F  73  16  189/80 H  100


 


 07/02/20 04:23  99.3 F  68  28 H  181/74 H  100








 Weight











Admit Weight                   135 lb 14.287 oz


 


Weight                         134 lb 9.6 oz














I&O: 


 











 07/01/20 07/02/20 07/03/20





 06:59 06:59 06:59


 


Intake Total 295 210 


 


Output Total 0 0 


 


Balance 295 210 











Result Diagrams: 


 07/01/20 15:07





 07/01/20 04:49


Additional Labs: 


 Accuchecks











  07/02/20 07/02/20 07/01/20





  13:57 04:23 21:57


 


POC Glucose  96  133 H  136 H














  07/01/20





  15:22


 


POC Glucose  140 H














Hospitalist ROS





- Medication


Medications: 


Active Medications











Generic Name Dose Route Start Last Admin





  Trade Name Freq  PRN Reason Stop Dose Admin


 


Acetaminophen  650 mg  06/21/20 15:16  06/30/20 17:38





  Tylenol  PO   650 mg





  Q4H PRN   Administration





  Headache/Fever/Mild Pain (1-3)   





     





     





     


 


Aspirin  81 mg  06/22/20 09:00  07/02/20 08:28





  Ecotrin  PO   81 mg





  DAILY MANDEEP   Administration





     





     





     





     


 


Atorvastatin Calcium  20 mg  06/22/20 21:00  07/01/20 21:41





  Lipitor  PO   20 mg





  HS MANDEEP   Administration





     





     





     





     


 


Clopidogrel Bisulfate  75 mg  06/22/20 09:00  07/02/20 08:28





  Plavix  PO   75 mg





  DAILY MANDEEP   Administration





     





     





     





     


 


Dextrose/Water  25 gm  06/25/20 00:33  06/27/20 17:56





  Dextrose 50%  SLOW IVP   25 gm





  PRN PRN   Administration





  Hypoglycemia   





     





     





     


 


Epoetin Gigi-epbx  8,000 unit  07/02/20 12:00  07/02/20 12:08





  Retacrit  IVP   8,000 unit





  TuThSa@1200 MANDEEP   Administration





     





     





     





     


 


Heparin Sodium (Porcine)  5,000 units  06/28/20 21:00  07/02/20 08:28





  Heparin  SC   5,000 units





  BID MANDEEP   Administration





     





     





     





     


 


Hydralazine HCl  10 mg  06/21/20 15:16  07/01/20 16:03





  Apresoline  SLOW IVP   10 mg





  Q4H PRN   Administration





  SBP > 180 and HR < 70   





     





     





     


 


Hydralazine HCl  25 mg  06/26/20 07:34  06/28/20 23:41





  Apresoline  PO   25 mg





  TID PRN   Administration





  SBP Greater Than 170   





     





     





     


 


Insulin Glargine 5 units/  0.05 mls @ 0 mls/hr  06/27/20 21:00  07/01/20 23:12





  Miscellaneous Medication  SC   Not Given





  HS MANDEEP   





     





     





     





     


 


Insulin Glargine 10 units/  0.1 mls @ 0 mls/hr  06/28/20 09:00  07/02/20 08:28





  Miscellaneous Medication  SC   Not Given





  QAM MANDEEP   





     





     





     





     


 


Insulin Human Lispro  0 units  06/25/20 00:33  06/26/20 18:25





  Humalog  SC   4 unit





  .MODERATE SLIDING SC PRN   Administration





  Moderate Correctional Scale   





     





     





     


 


Insulin Human Lispro  0 units  06/25/20 00:33  06/25/20 00:47





  Humalog  SC   5 unit





  .BEDTIME SLIDING SC PRN   Administration





  Bedtime Correctional Scale   





     





     





     


 


Metoprolol Tartrate  25 mg  06/22/20 09:00  07/02/20 08:28





  Lopressor  PO   25 mg





  BID MANDEEP   Administration





     





     





     





     


 


Pantoprazole Sodium  40 mg  06/22/20 09:00  07/02/20 08:28





  Protonix  PO   40 mg





  DAILY MANDEEP   Administration





     





     





     





     


 


Sevelamer Carbonate  1,600 mg  06/22/20 08:00  07/02/20 13:54





  Renvela  PO   Not Given





  TID-WM MANDEEP   





     





     





     





     


 


Sodium Chloride  10 ml  06/22/20 09:00  07/02/20 08:28





  Flush - Normal Saline  IVF   10 ml





  Q12HR MANDEEP   Administration





     





     





     





     














- Exam


General Appearance: ill appearing


Eye: PERRL, anicteric sclera


ENT: no oropharyngeal lesions, moist mucosa


Neck: symmetric, no JVD


Heart: RRR, no murmur


Respiratory: no wheezes, no rales


Gastrointestinal: soft, non-tender, non-distended, normal bowel sounds


Extremities: no cyanosis, no edema


Neurological: cranial nerve grossly intact, no new deficit





Hosp A/P


(1) Pneumonia due to COVID-19 virus


Code(s): U07.1 - COVID-19; J12.89 - OTHER VIRAL PNEUMONIA   Status: Acute   





(2) Metabolic encephalopathy


Code(s): G93.41 - METABOLIC ENCEPHALOPATHY   Status: Acute   





(3) CAD (coronary artery disease)


Code(s): I25.10 - ATHSCL HEART DISEASE OF NATIVE CORONARY ARTERY W/O ANG PCTRS 

  Status: Chronic   


Qualifiers: 


   Coronary Disease-Associated Artery/Lesion type: native artery   Native vs. 

transplanted heart: native heart   Associated angina: without angina   

Qualified Code(s): I25.10 - Atherosclerotic heart disease of native coronary 

artery without angina pectoris   





(4) End stage renal disease on dialysis


Code(s): N18.6 - END STAGE RENAL DISEASE; Z99.2 - DEPENDENCE ON RENAL DIALYSIS 

  Status: Chronic   





(5) Hypertension


Code(s): I10 - ESSENTIAL (PRIMARY) HYPERTENSION   Status: Chronic   


Qualifiers: 


   Hypertension type: essential hypertension   Qualified Code(s): I10 - 

Essential (primary) hypertension   





- Plan





is on asp, lipitor, plavix, lantus, lopressor, protonix and renvela


is off steroids


dc plan is to home with HH, speech, PT and OT per CM, not sure if he can manage 

himself at home?


family are still contemplating if he needs to go to snf?


prognosis guarded


trial of reg diet is on.


Is medically stable for dc 


At baseline he is wheel chair bound per staff

## 2020-07-03 NOTE — DIS
DATE OF ADMISSION:  06/21/2020



DATE OF DISCHARGE:  07/02/2020



DISCHARGE DISPOSITION:  To home.



PRIMARY DISCHARGE DIAGNOSES:  COVID-19 pneumonia, metabolic encephalopathy,

underlying dementia, coronary artery disease, end-stage renal disease on

hemodialysis, hypertension, deconditioning. 



PROCEDURES DONE DURING HOSPITALIZATION:  Chest x-ray done on the day of admission

showed bibasilar infiltrates.  Hemoglobin and hematocrit of 8 and 24, platelet count

154, had a white count of 2.7 on the day of admission, , 74% neutrophils, and

16% lymphocytes on the day of admission.  BUN and creatinine were 30 and 4.1 on

07/01/2020.  CRP levels were 16 on 23rd of June, on 28th of June, it was 9.8.

Ferritin peaked up to 2810 on 25th and came down to 2386 on 28th.  Total cholesterol

86, triglycerides 70, LDL 40, HDL 32.  COVID-19 PCR was positive on 06/21/2020.  HBS

antigen was nonreactive on 06/23/2020. 



DISCHARGE MEDICATIONS:  

1. Aspirin 81 mg p.o. daily.

2. Sensipar 30 mg p.o. daily.

3. Plavix 75 mg daily.

4. Vitamin D2 1250 mcg p.o. once weekly.

5. Metoprolol tartrate 25 mg twice daily.

6. Sevelamer 1600 mg p.o. 3 times daily.

7. Zocor 40 mg daily.

8. Hydralazine 25 mg three times daily.

9. Lantus 10 units subcu twice daily.

10. Protonix 40 mg p.o. daily.



ALLERGIES:  NO KNOWN DRUG ALLERGIES.



INPATIENT CONSULT:  Dr. Powers for Nephrology, Dr. Wright for Infectious Disease.



DISCHARGE PLAN:  The patient to follow up with Dr. Solis in 10 days.



BRIEF COURSE DURING HOSPITALIZATION:  The patient initially got admitted on the 21st of June with complaints of generalized weakness.  He was brought by family as he is

confused.  He had a temperature of 100.6 degrees on arrival.  In view of this, the

patient was tested for COVID-19 virus.  He came back positive for it.  He was also

found to have bilateral infiltrates.  He was placed on steroids.  The patient has

remained hemodynamically stable all through his stay.  He has had hemodialysis done

as well.  He has severe deconditioning and is barely able to stand with assist.  He

was mostly wheelchair-bound at home prior to arrival per family.  They were given

the option of placing him in a skilled unit, but they refused the same.  The patient

has a change of his dialysis in place and has been set up in [facility].  He

also has guardian home health set up prior to discharge.  The patient also has loss

of appetite and is eating 30% to 40% of his tray.  Family is aware of all of this

and they would like to take him home and multiple family members assist and help

him.  In view of his age of 75 with multiple comorbid conditions and very supportive

family, the patient is being discharged home.  Please see a face-to-face

documentation for the day of discharge on [facility]. 







Job ID:  [id number]
